# Patient Record
Sex: FEMALE | Race: WHITE | HISPANIC OR LATINO | Employment: FULL TIME | URBAN - METROPOLITAN AREA
[De-identification: names, ages, dates, MRNs, and addresses within clinical notes are randomized per-mention and may not be internally consistent; named-entity substitution may affect disease eponyms.]

---

## 2022-09-21 ENCOUNTER — OFFICE VISIT (OUTPATIENT)
Dept: OBSTETRICS AND GYNECOLOGY | Facility: CLINIC | Age: 32
End: 2022-09-21

## 2022-09-21 ENCOUNTER — PATIENT ROUNDING (BHMG ONLY) (OUTPATIENT)
Dept: OBSTETRICS AND GYNECOLOGY | Facility: CLINIC | Age: 32
End: 2022-09-21

## 2022-09-21 VITALS
WEIGHT: 118.2 LBS | SYSTOLIC BLOOD PRESSURE: 124 MMHG | DIASTOLIC BLOOD PRESSURE: 86 MMHG | BODY MASS INDEX: 17.51 KG/M2 | HEIGHT: 69 IN

## 2022-09-21 DIAGNOSIS — N64.3 GALACTORRHEA NOT ASSOCIATED WITH CHILDBIRTH: Primary | ICD-10-CM

## 2022-09-21 DIAGNOSIS — N63.11 MASS OF UPPER OUTER QUADRANT OF RIGHT BREAST: ICD-10-CM

## 2022-09-21 PROCEDURE — 99203 OFFICE O/P NEW LOW 30 MIN: CPT | Performed by: OBSTETRICS & GYNECOLOGY

## 2022-09-21 RX ORDER — MULTIPLE VITAMINS W/ MINERALS TAB 9MG-400MCG
1 TAB ORAL DAILY
COMMUNITY

## 2022-09-21 NOTE — PROGRESS NOTES
A MY-CHART MESSAGE HAS BEEN SENT TO THE PATIENT FOR PATIENT ROUNDING WITH [facility]

## 2022-09-21 NOTE — PROGRESS NOTES
"PROBLEM VISIT    Chief Complaint: mass in right breast      Stephanie Durham is a 32 y.o. patient who presents as a new pt with complaints of a right breast mass noted in 2022. Pt reports the mass is persistent but swells during her menstrual cycles. The mass is tender. She has hx of a left lactating adenoma that was biopsied:2017. Maternal GM with breast cancer at age 83. : pt stopped breast feeding 2020. Pt reporting bilateral milky discharge- spontaneous. She states she had a work up in 2018 that was negative. She reports she has not noted any discharge in the last several months. Pt is not on any contraception- she is ok with pregnancy.     Chief Complaint   Patient presents with   • Breast Mass     LUMP IN RIGHT BREAST             The following portions of the patient's history were reviewed and updated as appropriate: allergies, current medications and problem list.    Review of Systems   Constitutional: Negative for appetite change, chills, fatigue, fever and unexpected weight change.   Gastrointestinal: Negative for abdominal distention, abdominal pain, anal bleeding, blood in stool, constipation, diarrhea, nausea and vomiting.   Genitourinary: Negative for dyspareunia, dysuria, menstrual problem, pelvic pain, vaginal bleeding, vaginal discharge and vaginal pain.       /86   Ht 175.3 cm (69\")   Wt 53.6 kg (118 lb 3.2 oz)   LMP 2022 (Exact Date)   BMI 17.46 kg/m²     Physical Exam  Constitutional:       General: She is not in acute distress.     Appearance: Normal appearance. She is not ill-appearing, toxic-appearing or diaphoretic.   Chest:   Breasts:      Right: Tenderness present. No swelling, bleeding, inverted nipple, mass, nipple discharge or skin change.      Left: Nipple discharge present. No swelling, bleeding, inverted nipple, mass, skin change or tenderness.        Comments: No mass noted of right outer breast were patient's having her complaints.  Costochondritis " noted as she is very tender between her ribs in this area.  Patient's palpable concerns are equal on the right and the left side and no dominant masses noted.  With manipulation of the nipple a milky discharge was released from the left nipple from 1 duct.  Neurological:      General: No focal deficit present.      Mental Status: She is alert and oriented to person, place, and time. Mental status is at baseline.      Motor: No weakness.      Gait: Gait normal.   Psychiatric:         Mood and Affect: Mood normal.         Behavior: Behavior normal.         Thought Content: Thought content normal.         Judgment: Judgment normal.           Assessment & Plan   Diagnoses and all orders for this visit:    1. Galactorrhea not associated with childbirth (Primary)  -     Prolactin  -     TSH Rfx On Abnormal To Free T4    2. Mass of upper outer quadrant of right breast      32-year-old with complaints of right breast mass and milky discharge    No palpable masses on physical exam.  Patient does have some tenderness between her ribs.  Patient does admit to carrying her child on the side and suspect that it that is related to some of the discomfort that she is feeling.  We will check TSH and prolactin to evaluate for the milky discharge.  If the labs are normal then we will proceed with some imaging to evaluate.             No follow-ups on file.      Brenda Jacob DO    9/21/2022  10:36 EDT

## 2022-09-22 LAB
PROLACTIN SERPL-MCNC: 6.9 NG/ML (ref 4.8–23.3)
TSH SERPL DL<=0.005 MIU/L-ACNC: 1.05 UIU/ML (ref 0.27–4.2)

## 2022-09-28 DIAGNOSIS — N64.3 GALACTORRHEA NOT ASSOCIATED WITH CHILDBIRTH: Primary | ICD-10-CM

## 2022-10-20 ENCOUNTER — TELEPHONE (OUTPATIENT)
Dept: OBSTETRICS AND GYNECOLOGY | Facility: CLINIC | Age: 32
End: 2022-10-20

## 2022-10-20 ENCOUNTER — HOSPITAL ENCOUNTER (OUTPATIENT)
Dept: MAMMOGRAPHY | Facility: HOSPITAL | Age: 32
Discharge: HOME | End: 2022-10-20

## 2022-10-20 ENCOUNTER — HOSPITAL ENCOUNTER (OUTPATIENT)
Dept: ULTRASOUND IMAGING | Facility: HOSPITAL | Age: 32
Discharge: HOME | End: 2022-10-20

## 2022-10-20 DIAGNOSIS — N64.3 GALACTORRHEA NOT ASSOCIATED WITH CHILDBIRTH: ICD-10-CM

## 2022-10-20 PROCEDURE — G0279 TOMOSYNTHESIS, MAMMO: HCPCS

## 2022-10-20 PROCEDURE — 77066 DX MAMMO INCL CAD BI: CPT

## 2022-10-20 PROCEDURE — 76642 ULTRASOUND BREAST LIMITED: CPT

## 2022-11-01 DIAGNOSIS — N63.20 MASS OF MULTIPLE SITES OF LEFT BREAST: ICD-10-CM

## 2022-11-01 DIAGNOSIS — N64.3 GALACTORRHEA NOT ASSOCIATED WITH CHILDBIRTH: Primary | ICD-10-CM

## 2022-11-01 NOTE — TELEPHONE ENCOUNTER
I spoke with patient and reviewed her imagine with her. She also has galactorrhea from breasts. Will refer to breast specialist.

## 2022-11-02 ENCOUNTER — TELEPHONE (OUTPATIENT)
Dept: SURGERY | Facility: CLINIC | Age: 32
End: 2022-11-02

## 2022-11-10 PROBLEM — N64.52 NIPPLE DISCHARGE: Status: ACTIVE | Noted: 2022-11-10

## 2022-11-11 ENCOUNTER — TELEPHONE (OUTPATIENT)
Dept: SURGERY | Facility: CLINIC | Age: 32
End: 2022-11-11

## 2022-11-11 NOTE — TELEPHONE ENCOUNTER
Pt appt r/s due to family illness.    Pt r/s to 11/18/2022 at 8:40 am, arrive 8:10 am.    Spoke with pt howie holguin.    MEB

## 2022-11-17 NOTE — PROGRESS NOTES
"[facility]     Referring Provider: Brenda Jacob DO     Chief complaint: breast mass and nipple discharge     HPI: Ms. Stephanie Durham is a 31 yo woman, seen at the request of Brenda Jacob DO, for a new diagnosis of nipple discharge and right breast mass.      She denies any breast lumps, pain or skin changes.  2017 needle biopsy that showed left lactating adenoma which was not removed.      She reports that she is otherwise healthy and does not take any medications.     She has a family history of breast cancer in her Maternal grandmother aged 82.     She was by herself in clinic today.  Today presenting with concerns regarding abnormal imaging and right outer quadrant breast pain since .  The pain comes and goes and gets worse with her period. She has a hx of brady milky discharge for the past 9 years.  Very small amount, makes nipples crusty. Not expressing, not bloody, thinks from one duct    I personally reviewed her records and summarized her relevant breast history/imagin2022 Saw Dr. Jacob  \"Stephanie Durham is a 32 y.o. patient who presents as a new pt with complaints of a right breast mass noted in 2022. Pt reports the mass is persistent but swells during her menstrual cycles. The mass is tender. She has hx of a left lactating adenoma that was biopsied:2017. Maternal GM with breast cancer at age 83. : pt stopped breast feeding 2020. Pt reporting bilateral milky discharge- spontaneous. She states she had a work up in 2018 that was negative. She reports she has not noted any discharge in the last several months. Pt is not on any contraception- she is ok with pregnancy. \"  \"No mass noted of right outer breast were patient's having her complaints.  Costochondritis noted as she is very tender between her ribs in this area.  Patient's palpable concerns are equal on the right and the left side and no dominant masses noted.  With manipulation of the nipple a milky discharge " "was released from the left nipple from 1 duct\"    9/21/2022   TSH 1.050  Prolactin 6.9    10/20/2022 diagnostic bilateral mammogram and bilateral limited ultrasound at [facility]  FINDINGS:  There are scattered areas of fibroglandular density.   . In the left upper outer quadrant there is a 10.5 x 7.3 mm oval nodule  and  in the axilla high on image there is a biopsy marker clip  associated with a 11 mm nodule.  Targeted ultrasound of the left breast was performed as well as both  retroareolar regions. No retroareolar abnormalities are identified on  either side. 1 cm from the nipple on the left side, there may be a 7 mm  3 mm x 3 mm well-circumscribed nodule but this could also represent a  fat lobule. This Is uniform and nonshadowing. Another similar lesion is  noted at 1:00, 2 cm from nipple measuring 8 x 3 x 7 mm. It is also very  similar to the adjacent fat lobules.  In the axillary tail region there is a possible lesion identified  measuring 13 x 4 x 14 mm. This also is very similar to the adjacent fat  lobule  IMPRESSION:  Right breast single view mammogram and retroareolar  ultrasound was negative  Left breast single view mammogram shows 2 nodular densities. One is in  the far left upper-outer quadrant and the other in the axilla. The  axillary abnormality has a biopsy clip associated with it. No suspicious  ultrasound findings are identified in the left breast at the sites.  Possible small fibroadenomas versus fat lobules are identified 1:00 near  the nipple and in the left upper-outer quadrant.  Recommend 6 month follow-up left MLO view and repeat left breast  ultrasound  BI-RADS CATEGORY 3      Review of Systems   All other systems reviewed and are negative.      Medications:    Current Outpatient Medications:   •  multivitamin with minerals (MULTIVITAMIN ADULTS PO), Take 1 tablet by mouth Daily., Disp: , Rfl:     Allergies:  Allergies   Allergen Reactions   • Adhesive Tape Hives     bandaid " brand       Medical history:  History reviewed. No pertinent past medical history.    Surgical History:  History reviewed. No pertinent surgical history.    Family History:  Family History   Problem Relation Age of Onset   • Breast cancer Maternal Grandmother 82   • Lung cancer Maternal Grandmother        Social History:   Social History     Socioeconomic History   • Marital status:    Tobacco Use   • Smoking status: Never   • Smokeless tobacco: Never   Substance and Sexual Activity   • Alcohol use: Never   • Drug use: Never   • Sexual activity: Yes     Birth control/protection: None     Patient drink 44 ounces of caffeine a day.        GYNECOLOGIC HISTORY:   . P: 3. AB: 2.  Last menstrual period: 10/27/2022  Age at menarche: 14  Age at first childbirth: 23  Lactation/How lon months  Age at menopause: premenopausal  Total years of oral contraceptive use: 2 years  Total years of hormone replacement therapy: 0      Physical Exam  Vitals:    22 0853   BP: 108/52   Pulse: 66   SpO2: 99%     ECOG 0 - Asymptomatic  General: NAD, well appearing  Psych: a&o x 3, normal mood and affect  Eyes: EOMI, no scleral icterus  ENMT: neck supple without masses or thyromegaly, mucus membranes moist  Resp: normal effort, CTAB  CV: RRR, no murmurs, no edema   GI: soft, NT, ND  MSK: normal gait, normal ROM in bilateral shoulders  Lymph nodes:  no cervical, supraclavicular or axillary lymphadenopathy  Breast: symmetric, small  Right:  No visible abnormalities on inspection while seated, with arms raised or hands on hips. No masses, skin changes, or nipple abnormalities. No discharge   Left:  No visible abnormalities on inspection while seated, with arms raised or hands on hips. No masses, skin changes, or nipple abnormalities. No discharge      Assessment:  32 y.o. F with a new diagnosis of breast pain  2.nipple discharge  3. Abnormal imaging  4. Family history of breast cancer    Discussion:  1. We discussed types of  nipple discharge. We discussed that physiologic discharge is usually bilateral, nonspontaneous, multi-ductal, and milky, but can be yellow, green or brown. That this can sometimes be associated with elevated prolactin levels and that elevated prolactin levels can have multiple causes (pregnancy, pituitary, thyroid, medications, etc).    We discussed that pathologic nipple discharge usually is unilateral, spontaneous, persistent, comes from a single duct, is bloody, clear, or serosanguinous, or is associated with a palpable or radiological evident breast mass.   I believe this is physiologic and no MRI work-up is needed at this time.  2. I explained the concept of a BI-RADS 3 designation and the process of imaging surveillance. We discussed that a BI-RADS 3 designation describes an imaging finding that is 98-99% likely to represent a benign process. We discussed that the most common management of a BI-RADS 3 finding is initial 6 month imaging surveillance and that the entire period of imaging surveillance can often take 2 years.   3. We had a lengthy discussion about breast pain and how it can sometimes be difficult to treat. We discussed that this is often related to hormonal changes. Caffeine and nicotine can also play a role in breast pain. We also discussed the importance of wearing a good supportive bra at all times including bedtime.  We discussed additional therapies such as vitamin E supplementation and Primrose oil, and that these may or may not be useful. We discussed using OTC tylenol or ibuprofen for pain control. Finally, we discussed the use of topical NSAID creams.    4. Lifetime risk per TC model is 18.1%    Plan:  1. Take Vitamin E, 200 U, twice daily or evening primrose oil once or twice daily   3. Continue to reduce caffeine intake.   4. May use OTC tylenol or ibuprofen, or topical products, for pain control.   5. F/u in 3 months. Call sooner with any questions, concerns   6. Dx left mammo and left  limited us in 6 mons  Interventions for breast discomfort reduction were given and reviewed.  She will return to clinic in 3 months for evaluation of their effectiveness with exam only.    GISSEL Lee    I have spent 50 min in face to face time with the patient and in chart review    CC:  Brenda Jacob, DO  Provider, No Known

## 2022-11-18 ENCOUNTER — OFFICE VISIT (OUTPATIENT)
Dept: SURGERY | Facility: CLINIC | Age: 32
End: 2022-11-18

## 2022-11-18 VITALS
BODY MASS INDEX: 17.03 KG/M2 | SYSTOLIC BLOOD PRESSURE: 108 MMHG | WEIGHT: 115 LBS | HEART RATE: 66 BPM | OXYGEN SATURATION: 99 % | DIASTOLIC BLOOD PRESSURE: 52 MMHG | HEIGHT: 69 IN

## 2022-11-18 DIAGNOSIS — N64.4 BREAST PAIN: ICD-10-CM

## 2022-11-18 DIAGNOSIS — Z80.3 FAMILY HISTORY OF BREAST CANCER: ICD-10-CM

## 2022-11-18 DIAGNOSIS — R92.8 ABNORMAL FINDING ON BREAST IMAGING: Primary | ICD-10-CM

## 2022-11-18 DIAGNOSIS — N64.52 NIPPLE DISCHARGE: ICD-10-CM

## 2022-11-18 PROCEDURE — 99204 OFFICE O/P NEW MOD 45 MIN: CPT | Performed by: NURSE PRACTITIONER

## 2023-02-16 NOTE — PROGRESS NOTES
"[facility]     Referring Provider: No ref. provider found     Chief complaint: breast mass and nipple discharge     HPI: Ms. Stephanie Durham is a 31 yo woman, seen at the request of No ref. provider found, for a new diagnosis of nipple discharge and right breast mass.      She denies any breast lumps, pain or skin changes.  2017 needle biopsy that showed left lactating adenoma which was not removed.      She reports that she is otherwise healthy and does not take any medications.     She has a family history of breast cancer in her Maternal grandmother aged 82.     She was by herself in clinic today.  Today presenting with concerns regarding abnormal imaging and right outer quadrant breast pain since .  The pain comes and goes and gets worse with her period. She has a hx of brady milky discharge for the past 9 years.  Very small amount, makes nipples crusty. Not expressing, not bloody, thinks from one duct      2023 Interval History  Patient presented to the office today for routine follow-up.  She has been using vitamin E daily which has helped significantly with her breast pain.  She is still experiencing breast pain but only around her period.  Also she denies parents any more nipple discharge since before being seen the last time that she was here.  She already has imaging set up from May to follow-up on a BI-RADS 3.  She has no new breast concerns today.    I personally reviewed her records and summarized her relevant breast history/imagin2022 Saw Dr. Jacob  \"Stephanie Durham is a 32 y.o. patient who presents as a new pt with complaints of a right breast mass noted in 2022. Pt reports the mass is persistent but swells during her menstrual cycles. The mass is tender. She has hx of a left lactating adenoma that was biopsied:2017. Maternal GM with breast cancer at age 83. : pt stopped breast feeding 2020. Pt reporting bilateral milky discharge- spontaneous. She states she had " "a work up in 2018 that was negative. She reports she has not noted any discharge in the last several months. Pt is not on any contraception- she is ok with pregnancy. \"  \"No mass noted of right outer breast were patient's having her complaints.  Costochondritis noted as she is very tender between her ribs in this area.  Patient's palpable concerns are equal on the right and the left side and no dominant masses noted.  With manipulation of the nipple a milky discharge was released from the left nipple from 1 duct\"    9/21/2022   TSH 1.050  Prolactin 6.9    10/20/2022 diagnostic bilateral mammogram and bilateral limited ultrasound at [facility]  FINDINGS:  There are scattered areas of fibroglandular density.   . In the left upper outer quadrant there is a 10.5 x 7.3 mm oval nodule  and  in the axilla high on image there is a biopsy marker clip  associated with a 11 mm nodule.  Targeted ultrasound of the left breast was performed as well as both  retroareolar regions. No retroareolar abnormalities are identified on  either side. 1 cm from the nipple on the left side, there may be a 7 mm  3 mm x 3 mm well-circumscribed nodule but this could also represent a  fat lobule. This Is uniform and nonshadowing. Another similar lesion is  noted at 1:00, 2 cm from nipple measuring 8 x 3 x 7 mm. It is also very  similar to the adjacent fat lobules.  In the axillary tail region there is a possible lesion identified  measuring 13 x 4 x 14 mm. This also is very similar to the adjacent fat  lobule  IMPRESSION:  Right breast single view mammogram and retroareolar  ultrasound was negative  Left breast single view mammogram shows 2 nodular densities. One is in  the far left upper-outer quadrant and the other in the axilla. The  axillary abnormality has a biopsy clip associated with it. No suspicious  ultrasound findings are identified in the left breast at the sites.  Possible small fibroadenomas versus fat lobules are " identified 1:00 near  the nipple and in the left upper-outer quadrant.  Recommend 6 month follow-up left MLO view and repeat left breast  ultrasound  BI-RADS CATEGORY 3      Review of Systems   All other systems reviewed and are negative.      Medications:    Current Outpatient Medications:   •  multivitamin with minerals tablet tablet, Take 1 tablet by mouth Daily., Disp: , Rfl:   •  VITAMIN E PO, Take  by mouth Daily., Disp: , Rfl:     Allergies:  Allergies   Allergen Reactions   • Adhesive Tape Hives     bandaid brand       Medical history:  History reviewed. No pertinent past medical history.    Surgical History:  History reviewed. No pertinent surgical history.    Family History:  Family History   Problem Relation Age of Onset   • Breast cancer Maternal Grandmother 82   • Lung cancer Maternal Grandmother        Social History:   Social History     Socioeconomic History   • Marital status:    Tobacco Use   • Smoking status: Never   • Smokeless tobacco: Never   Substance and Sexual Activity   • Alcohol use: Never   • Drug use: Never   • Sexual activity: Yes     Birth control/protection: None     Patient drink 44 ounces of caffeine a day.        GYNECOLOGIC HISTORY:   . P: 3. AB: 2.  Last menstrual period: 10/27/2022  Age at menarche: 14  Age at first childbirth: 23  Lactation/How lon months  Age at menopause: premenopausal  Total years of oral contraceptive use: 2 years  Total years of hormone replacement therapy: 0      Physical Exam  Vitals:    23 0940   BP: 108/66   Pulse: 75   Temp: 97.2 °F (36.2 °C)   SpO2: 98%     ECOG 0 - Asymptomatic  General: NAD, well appearing  Psych: a&o x 3, normal mood and affect  Eyes: EOMI, no scleral icterus  ENMT: neck supple without masses or thyromegaly, mucus membranes moist  Resp: normal effort, CTAB  CV: RRR, no murmurs, no edema   GI: soft, NT, ND  MSK: normal gait, normal ROM in bilateral shoulders  Lymph nodes:  no cervical, supraclavicular or  axillary lymphadenopathy  Breast: symmetric, small  Right:  No visible abnormalities on inspection while seated, with arms raised or hands on hips. No masses, skin changes, or nipple abnormalities. No discharge   Left:  No visible abnormalities on inspection while seated, with arms raised or hands on hips. No masses, skin changes, or nipple abnormalities. No discharge      Assessment:  32 y.o. F with a new diagnosis of breast pain  2.nipple discharge  3. Abnormal imaging  4. Family history of breast cancer    Discussion:  1. We discussed types of nipple discharge. We discussed that physiologic discharge is usually bilateral, nonspontaneous, multi-ductal, and milky, but can be yellow, green or brown. That this can sometimes be associated with elevated prolactin levels and that elevated prolactin levels can have multiple causes (pregnancy, pituitary, thyroid, medications, etc).    We discussed that pathologic nipple discharge usually is unilateral, spontaneous, persistent, comes from a single duct, is bloody, clear, or serosanguinous, or is associated with a palpable or radiological evident breast mass.   I believe this is physiologic and no MRI work-up is needed at this time.  2. I explained the concept of a BI-RADS 3 designation and the process of imaging surveillance. We discussed that a BI-RADS 3 designation describes an imaging finding that is 98-99% likely to represent a benign process. We discussed that the most common management of a BI-RADS 3 finding is initial 6 month imaging surveillance and that the entire period of imaging surveillance can often take 2 years.   3. We had a lengthy discussion about breast pain and how it can sometimes be difficult to treat. We discussed that this is often related to hormonal changes. Caffeine and nicotine can also play a role in breast pain. We also discussed the importance of wearing a good supportive bra at all times including bedtime.  We discussed additional therapies  such as vitamin E supplementation and Primrose oil, and that these may or may not be useful. We discussed using OTC tylenol or ibuprofen for pain control. Finally, we discussed the use of topical NSAID creams.    4. Lifetime risk per TC model is 18.1%    Plan:  1. Take Vitamin E, 200 U, twice daily or evening primrose oil once or twice daily   3. Continue to reduce caffeine intake.   4. May use OTC tylenol or ibuprofen, or topical products, for pain control.   5. F/u in 3 months. Call sooner with any questions, concerns   6. Dx left mammo and left limited us in May, will call with results  Interventions for breast discomfort reduction were given and reviewed.  She will return to clinic in 3 months for evaluation of their effectiveness with exam only.    GISSEL Lee    I have spent 50 min in face to face time with the patient and in chart review    CC:  No ref. provider found  Provider, No Known

## 2023-02-17 ENCOUNTER — OFFICE VISIT (OUTPATIENT)
Dept: SURGERY | Facility: CLINIC | Age: 33
End: 2023-02-17
Payer: COMMERCIAL

## 2023-02-17 VITALS
TEMPERATURE: 97.2 F | OXYGEN SATURATION: 98 % | HEART RATE: 75 BPM | HEIGHT: 69 IN | SYSTOLIC BLOOD PRESSURE: 108 MMHG | DIASTOLIC BLOOD PRESSURE: 66 MMHG | WEIGHT: 122.4 LBS | BODY MASS INDEX: 18.13 KG/M2

## 2023-02-17 DIAGNOSIS — Z80.3 FAMILY HISTORY OF BREAST CANCER: ICD-10-CM

## 2023-02-17 DIAGNOSIS — N64.52 NIPPLE DISCHARGE: ICD-10-CM

## 2023-02-17 DIAGNOSIS — R92.8 ABNORMAL FINDING ON BREAST IMAGING: ICD-10-CM

## 2023-02-17 DIAGNOSIS — N64.4 BREAST PAIN: Primary | ICD-10-CM

## 2023-02-17 PROCEDURE — 99213 OFFICE O/P EST LOW 20 MIN: CPT | Performed by: NURSE PRACTITIONER

## 2023-05-12 ENCOUNTER — HOSPITAL ENCOUNTER (OUTPATIENT)
Dept: MAMMOGRAPHY | Facility: HOSPITAL | Age: 33
Discharge: HOME | End: 2023-05-12
Payer: COMMERCIAL

## 2023-05-12 ENCOUNTER — HOSPITAL ENCOUNTER (OUTPATIENT)
Dept: ULTRASOUND IMAGING | Facility: HOSPITAL | Age: 33
Discharge: HOME | End: 2023-05-12
Payer: COMMERCIAL

## 2023-05-12 DIAGNOSIS — R92.8 ABNORMAL FINDING ON BREAST IMAGING: ICD-10-CM

## 2023-05-12 PROCEDURE — 77065 DX MAMMO INCL CAD UNI: CPT

## 2023-05-12 PROCEDURE — G0279 TOMOSYNTHESIS, MAMMO: HCPCS

## 2023-05-12 PROCEDURE — 76642 ULTRASOUND BREAST LIMITED: CPT

## 2023-05-15 ENCOUNTER — TELEPHONE (OUTPATIENT)
Dept: SURGERY | Facility: CLINIC | Age: 33
End: 2023-05-15
Payer: COMMERCIAL

## 2023-05-17 ENCOUNTER — TELEPHONE (OUTPATIENT)
Dept: SURGERY | Facility: CLINIC | Age: 33
End: 2023-05-17
Payer: COMMERCIAL

## 2023-05-17 DIAGNOSIS — R92.8 ABNORMAL FINDING ON BREAST IMAGING: Primary | ICD-10-CM

## 2023-05-17 NOTE — TELEPHONE ENCOUNTER
Called and spoke to pt and let her know her ultrasound is on 11/10/2023 @11am here at the [facility] on [facility]. Her appointment with Siva will be a week after that on 11/17 @120pm       Pt stated understanding

## 2023-05-17 NOTE — TELEPHONE ENCOUNTER
Called and gave her her left diagnostic and ultrasound results of BI-RADS 3.  Please set her up for a ultrasound in 6 months followed by an office visit with me.  The order for the ultrasound has been placed.

## 2023-05-17 NOTE — TELEPHONE ENCOUNTER
Siva Dudley, GISSEL 11 minutes ago (8:32 AM)     KE  Called and gave her her left diagnostic and ultrasound results of BI-RADS 3.  Please set her up for a ultrasound in 6 months followed by an office visit with me.  The order for the ultrasound has been placed.              Called and spoke to pt and let her know her ultrasound is on 11/10/2023 @11am here at the [facility]. Her appointment with Siva will be a week after that on 11/17 @120pm       Pt stated understanding

## 2023-09-01 ENCOUNTER — OFFICE VISIT (OUTPATIENT)
Dept: INTERNAL MEDICINE | Facility: CLINIC | Age: 33
End: 2023-09-01
Payer: COMMERCIAL

## 2023-09-01 VITALS
SYSTOLIC BLOOD PRESSURE: 112 MMHG | TEMPERATURE: 97.7 F | DIASTOLIC BLOOD PRESSURE: 68 MMHG | RESPIRATION RATE: 16 BRPM | BODY MASS INDEX: 17.51 KG/M2 | HEART RATE: 74 BPM | OXYGEN SATURATION: 100 % | HEIGHT: 69 IN | WEIGHT: 118.2 LBS

## 2023-09-01 DIAGNOSIS — L71.9 ROSACEA: ICD-10-CM

## 2023-09-01 DIAGNOSIS — E04.1 THYROID NODULE: ICD-10-CM

## 2023-09-01 DIAGNOSIS — R55 VASOVAGAL SYNCOPE: Primary | ICD-10-CM

## 2023-09-01 DIAGNOSIS — N92.6 IRREGULAR PERIODS: ICD-10-CM

## 2023-09-01 PROBLEM — Z34.90 TERM PREGNANCY: Status: ACTIVE | Noted: 2019-07-10

## 2023-09-01 PROCEDURE — 99204 OFFICE O/P NEW MOD 45 MIN: CPT | Performed by: INTERNAL MEDICINE

## 2023-09-01 RX ORDER — ONDANSETRON 8 MG/1
TABLET, ORALLY DISINTEGRATING ORAL
COMMUNITY
Start: 2023-06-17 | End: 2023-10-09

## 2023-09-01 RX ORDER — METRONIDAZOLE 10 MG/G
GEL TOPICAL DAILY
Qty: 60 G | Refills: 5 | Status: SHIPPED | OUTPATIENT
Start: 2023-09-01

## 2023-09-01 NOTE — PROGRESS NOTES
"Chief Complaint  Establish Care (Passing out and fainting/Breast issues/Thyroid nodule)    Subjective        Stephanie Durham presents to [facility]  History of Present Illness  Here to establish care    Syncopal episodes - prodrome prior and feels palpitations, sweating, etc. No chest pain or shortness of breath, sporadic, no clear culprit  Thyroid nodule reported, imaging benign per her report  Seeing breast surgery chronic breast issues    Current Outpatient Medications:     multivitamin with minerals tablet tablet, Take 1 tablet by mouth Daily., Disp: , Rfl:     ondansetron ODT (ZOFRAN-ODT) 8 MG disintegrating tablet, , Disp: , Rfl:     VITAMIN E PO, Take  by mouth Daily., Disp: , Rfl:     metroNIDAZOLE (Metrogel) 1 % gel, Apply  topically to the appropriate area as directed Daily., Disp: 60 g, Rfl: 5  Past Medical History:   Diagnosis Date    Anemia     Anxiety     Headache     History of medical problems     Hypertension     Urinary tract infection     Visual impairment      Past Surgical History:   Procedure Laterality Date    BREAST SURGERY Left 2017    Left Breast Biopsy     Family History   Problem Relation Age of Onset    Mental illness Mother     Arthritis Mother     Anxiety disorder Mother     Cancer Father     Learning disabilities Brother     Thyroid disease Maternal Grandmother     Breast cancer Maternal Grandmother 82    Lung cancer Maternal Grandmother      Social History     Socioeconomic History    Marital status:    Tobacco Use    Smoking status: Never    Smokeless tobacco: Never   Vaping Use    Vaping Use: Never used   Substance and Sexual Activity    Alcohol use: Never    Drug use: Never    Sexual activity: Yes     Partners: Male     Birth control/protection: None        reviewed and updated    Objective   Vital Signs:  /68   Pulse 74   Temp 97.7 °F (36.5 °C)   Resp 16   Ht 175.3 cm (69\")   Wt 53.6 kg (118 lb 3.2 oz)   SpO2 " "100%   BMI 17.46 kg/m²   Estimated body mass index is 17.46 kg/m² as calculated from the following:    Height as of this encounter: 175.3 cm (69\").    Weight as of this encounter: 53.6 kg (118 lb 3.2 oz).    Physical Exam  Vitals and nursing note reviewed.   Constitutional:       General: She is not in acute distress.     Appearance: Normal appearance.   HENT:      Head: Normocephalic and atraumatic.      Right Ear: External ear normal.      Left Ear: External ear normal.      Nose: Nose normal. No congestion.      Mouth/Throat:      Mouth: Mucous membranes are moist.      Pharynx: No oropharyngeal exudate or posterior oropharyngeal erythema.   Eyes:      Extraocular Movements: Extraocular movements intact.      Conjunctiva/sclera: Conjunctivae normal.      Pupils: Pupils are equal, round, and reactive to light.   Cardiovascular:      Rate and Rhythm: Normal rate and regular rhythm.      Pulses: Normal pulses.      Heart sounds: Normal heart sounds. No murmur heard.  Pulmonary:      Effort: Pulmonary effort is normal. No respiratory distress.      Breath sounds: Normal breath sounds. No wheezing or rales.   Musculoskeletal:      Cervical back: Normal range of motion.   Skin:     General: Skin is warm.      Capillary Refill: Capillary refill takes less than 2 seconds.   Neurological:      General: No focal deficit present.      Mental Status: She is alert and oriented to person, place, and time. Mental status is at baseline.      Cranial Nerves: No cranial nerve deficit.   Psychiatric:         Mood and Affect: Mood normal.         Behavior: Behavior normal.         Thought Content: Thought content normal.        Result Review :  The following data was reviewed by: Pedro Lema MD on 09/01/2023:  Common labs          9/1/2023    10:52   Common Labs   Glucose 81    BUN 15    Creatinine 0.83    Sodium 140    Potassium 4.4    Chloride 104    Calcium 9.7    Total Protein 6.9    Albumin 4.5    Total Bilirubin 0.5  "   Alkaline Phosphatase 37    AST (SGOT) 12    ALT (SGPT) 7    WBC 5.83    Hemoglobin 13.8    Hematocrit 41.2    Platelets 291    Total Cholesterol 172    Triglycerides 41    HDL Cholesterol 68    LDL Cholesterol  95    Hemoglobin A1C 4.80      Data reviewed : prior office notes reviewed      ECG 12 Lead    Date/Time: 10/8/2023 10:56 AM  Performed by: Pedro Lema MD    Authorized by: Pedro Lema MD  Comparison: not compared with previous ECG   Rhythm: sinus rhythm  Rate: normal  Conduction: conduction normal  ST Segments: ST segments normal  T Waves: T waves normal  QRS axis: normal  Other: no other findings    Clinical impression: normal ECG            Assessment and Plan   Diagnoses and all orders for this visit:    1. Vasovagal syncope (Primary)  -     CBC w AUTO Differential  -     Comprehensive metabolic panel  -     TSH  -     T4, free  -     Thyroid Antibodies  -     Lipid panel  -     Hemoglobin A1c  -     ECG 12 Lead  -     Iron and TIBC  -     Ferritin    2. Thyroid nodule  -     TSH  -     T4, free  -     Thyroid Antibodies    3. Irregular periods  -     Ambulatory Referral to Obstetrics / Gynecology    4. Rosacea  -     metroNIDAZOLE (Metrogel) 1 % gel; Apply  topically to the appropriate area as directed Daily.  Dispense: 60 g; Refill: 5  -     Ambulatory Referral to Dermatology    Vasovagal syncope - check labs and EKG unrevealing   Thyroid nodule - will check thyroid studies and determine next steps pending results       I spent 20 minutes caring for Stephanie on this date of service. This time includes time spent by me in the following activities:preparing for the visit, reviewing tests, obtaining and/or reviewing a separately obtained history, performing a medically appropriate examination and/or evaluation , counseling and educating the patient/family/caregiver, ordering medications, tests, or procedures, and documenting information in the medical record  Follow Up   Return for Next scheduled follow  up.  Patient was given instructions and counseling regarding her condition or for health maintenance advice. Please see specific information pulled into the AVS if appropriate.     Pedro Lema MD  [facility]

## 2023-09-05 LAB
ALBUMIN SERPL-MCNC: 4.5 G/DL (ref 3.5–5.2)
ALBUMIN/GLOB SERPL: 1.9 G/DL
ALP SERPL-CCNC: 37 U/L (ref 39–117)
ALT SERPL-CCNC: 7 U/L (ref 1–33)
AST SERPL-CCNC: 12 U/L (ref 1–32)
BASOPHILS # BLD AUTO: 0.07 10*3/MM3 (ref 0–0.2)
BASOPHILS NFR BLD AUTO: 1.2 % (ref 0–1.5)
BILIRUB SERPL-MCNC: 0.5 MG/DL (ref 0–1.2)
BUN SERPL-MCNC: 15 MG/DL (ref 6–20)
BUN/CREAT SERPL: 18.1 (ref 7–25)
CALCIUM SERPL-MCNC: 9.7 MG/DL (ref 8.6–10.5)
CHLORIDE SERPL-SCNC: 104 MMOL/L (ref 98–107)
CHOLEST SERPL-MCNC: 172 MG/DL (ref 0–200)
CO2 SERPL-SCNC: 24.9 MMOL/L (ref 22–29)
CREAT SERPL-MCNC: 0.83 MG/DL (ref 0.57–1)
EGFRCR SERPLBLD CKD-EPI 2021: 96.2 ML/MIN/1.73
EOSINOPHIL # BLD AUTO: 0.34 10*3/MM3 (ref 0–0.4)
EOSINOPHIL NFR BLD AUTO: 5.8 % (ref 0.3–6.2)
ERYTHROCYTE [DISTWIDTH] IN BLOOD BY AUTOMATED COUNT: 11.5 % (ref 12.3–15.4)
FERRITIN SERPL-MCNC: 50 NG/ML (ref 13–150)
GLOBULIN SER CALC-MCNC: 2.4 GM/DL
GLUCOSE SERPL-MCNC: 81 MG/DL (ref 65–99)
HBA1C MFR BLD: 4.8 % (ref 4.8–5.6)
HCT VFR BLD AUTO: 41.2 % (ref 34–46.6)
HDLC SERPL-MCNC: 68 MG/DL (ref 40–60)
HGB BLD-MCNC: 13.8 G/DL (ref 12–15.9)
IMM GRANULOCYTES # BLD AUTO: 0.01 10*3/MM3 (ref 0–0.05)
IMM GRANULOCYTES NFR BLD AUTO: 0.2 % (ref 0–0.5)
IRON SATN MFR SERPL: 27 % (ref 20–50)
IRON SERPL-MCNC: 96 MCG/DL (ref 37–145)
LDLC SERPL CALC-MCNC: 95 MG/DL (ref 0–100)
LYMPHOCYTES # BLD AUTO: 2.48 10*3/MM3 (ref 0.7–3.1)
LYMPHOCYTES NFR BLD AUTO: 42.5 % (ref 19.6–45.3)
MCH RBC QN AUTO: 31.9 PG (ref 26.6–33)
MCHC RBC AUTO-ENTMCNC: 33.5 G/DL (ref 31.5–35.7)
MCV RBC AUTO: 95.2 FL (ref 79–97)
MONOCYTES # BLD AUTO: 0.43 10*3/MM3 (ref 0.1–0.9)
MONOCYTES NFR BLD AUTO: 7.4 % (ref 5–12)
NEUTROPHILS # BLD AUTO: 2.5 10*3/MM3 (ref 1.7–7)
NEUTROPHILS NFR BLD AUTO: 42.9 % (ref 42.7–76)
NRBC BLD AUTO-RTO: 0.2 /100 WBC (ref 0–0.2)
PLATELET # BLD AUTO: 291 10*3/MM3 (ref 140–450)
POTASSIUM SERPL-SCNC: 4.4 MMOL/L (ref 3.5–5.2)
PROT SERPL-MCNC: 6.9 G/DL (ref 6–8.5)
RBC # BLD AUTO: 4.33 10*6/MM3 (ref 3.77–5.28)
SODIUM SERPL-SCNC: 140 MMOL/L (ref 136–145)
T4 FREE SERPL-MCNC: 1.38 NG/DL (ref 0.93–1.7)
THYROGLOB AB SERPL-ACNC: <1 IU/ML (ref 0–0.9)
THYROPEROXIDASE AB SERPL-ACNC: <9 IU/ML (ref 0–34)
TIBC SERPL-MCNC: 350 MCG/DL
TRIGL SERPL-MCNC: 41 MG/DL (ref 0–150)
TSH SERPL DL<=0.005 MIU/L-ACNC: 0.75 UIU/ML (ref 0.27–4.2)
UIBC SERPL-MCNC: 254 MCG/DL (ref 112–346)
VLDLC SERPL CALC-MCNC: 9 MG/DL (ref 5–40)
WBC # BLD AUTO: 5.83 10*3/MM3 (ref 3.4–10.8)

## 2023-10-08 PROCEDURE — 93000 ELECTROCARDIOGRAM COMPLETE: CPT | Performed by: INTERNAL MEDICINE

## 2023-10-09 ENCOUNTER — OFFICE VISIT (OUTPATIENT)
Dept: OBSTETRICS AND GYNECOLOGY | Facility: CLINIC | Age: 33
End: 2023-10-09
Payer: COMMERCIAL

## 2023-10-09 VITALS
HEIGHT: 69 IN | WEIGHT: 117 LBS | SYSTOLIC BLOOD PRESSURE: 102 MMHG | DIASTOLIC BLOOD PRESSURE: 62 MMHG | BODY MASS INDEX: 17.33 KG/M2

## 2023-10-09 DIAGNOSIS — Z13.89 SCREENING FOR GENITOURINARY CONDITION: ICD-10-CM

## 2023-10-09 DIAGNOSIS — Z01.419 ROUTINE GYNECOLOGICAL EXAMINATION: ICD-10-CM

## 2023-10-09 DIAGNOSIS — Z11.51 SPECIAL SCREENING EXAMINATION FOR HUMAN PAPILLOMAVIRUS (HPV): ICD-10-CM

## 2023-10-09 DIAGNOSIS — Z01.419 CERVICAL SMEAR, AS PART OF ROUTINE GYNECOLOGICAL EXAMINATION: Primary | ICD-10-CM

## 2023-10-09 DIAGNOSIS — Z31.69 INFERTILITY COUNSELING: ICD-10-CM

## 2023-10-09 LAB
B-HCG UR QL: NEGATIVE
BILIRUB BLD-MCNC: NEGATIVE MG/DL
CLARITY, POC: CLEAR
COLOR UR: YELLOW
EXPIRATION DATE: NORMAL
GLUCOSE UR STRIP-MCNC: NEGATIVE MG/DL
INTERNAL NEGATIVE CONTROL: NORMAL
INTERNAL POSITIVE CONTROL: NORMAL
KETONES UR QL: NEGATIVE
LEUKOCYTE EST, POC: NEGATIVE
Lab: NORMAL
NITRITE UR-MCNC: NEGATIVE MG/ML
PH UR: 5 [PH] (ref 5–8)
PROT UR STRIP-MCNC: NEGATIVE MG/DL
RBC # UR STRIP: NEGATIVE /UL
SP GR UR: 1 (ref 1–1.03)
UROBILINOGEN UR QL: NORMAL

## 2023-10-09 NOTE — PROGRESS NOTES
GYN Annual Exam     CC- Here for annual exam.     Stephanie Durham is a 33 y.o. female previous patient not seen in the last three years who presents for annual well woman exam. Periods are regular every 20-40 days, lasting  3-7 days   days.     Pt is a ; unable to have + Hcg in the last couple of years. Uses NFP and withdrawal method. Spouse is in 2nd year of law school.     OB History          5    Para   3    Term   3            AB   2    Living   3         SAB   2    IAB        Ectopic        Molar        Multiple        Live Births   3                Menarche: 13yo   Current contraception: coitus interruptus and rhythm method  History of abnormal Pap smear: no  History of abnormal mammogram:  Is followed by a breast surgeon for a lump. Has imaging q 6 months for 2 years   Family history of uterine, colon or ovarian cancer: no  Family history of breast cancer: no  H/o STDs: Denies   Last pap:??  Gardasil:refuses  KARUNA: none    Health Maintenance   Topic Date Due    Annual Gynecologic Pelvic and Breast Exam  Never done    HEPATITIS C SCREENING  Never done    ANNUAL PHYSICAL  Never done    PAP SMEAR  Never done    INFLUENZA VACCINE  2023    COVID-19 Vaccine (4 - -24 season) 2023    BMI FOLLOWUP  2023    TDAP/TD VACCINES (4 - Td or Tdap) 2025    Pneumococcal Vaccine 0-64  Aged Out       Past Medical History:   Diagnosis Date    Anemia     Anxiety     Headache     History of medical problems     Hypertension     Urinary tract infection     Visual impairment        Past Surgical History:   Procedure Laterality Date    BREAST SURGERY Left 2017    Left Breast Biopsy         Current Outpatient Medications:     metroNIDAZOLE (Metrogel) 1 % gel, Apply  topically to the appropriate area as directed Daily., Disp: 60 g, Rfl: 5    multivitamin with minerals tablet tablet, Take 1 tablet by mouth Daily., Disp: , Rfl:     Allergies   Allergen Reactions    Adhesive Tape Hives      "bandaid brand       Social History     Tobacco Use    Smoking status: Never    Smokeless tobacco: Never   Vaping Use    Vaping Use: Never used   Substance Use Topics    Alcohol use: Never    Drug use: Never       Family History   Problem Relation Age of Onset    Mental illness Mother     Arthritis Mother     Anxiety disorder Mother     Cancer Father     Learning disabilities Brother     Thyroid disease Maternal Grandmother     Breast cancer Maternal Grandmother 82    Lung cancer Maternal Grandmother        Review of Systems  Infertility     /62   Ht 175.3 cm (69\")   Wt 53.1 kg (117 lb)   LMP 10/02/2023   Breastfeeding No   BMI 17.28 kg/mý     Physical Exam  Exam conducted with a chaperone present.   Constitutional:       Appearance: Normal appearance.   Cardiovascular:      Rate and Rhythm: Normal rate and regular rhythm.   Pulmonary:      Effort: Pulmonary effort is normal.      Breath sounds: Normal breath sounds.   Abdominal:      General: Abdomen is flat.      Palpations: Abdomen is soft.   Genitourinary:     General: Normal vulva.      Exam position: Lithotomy position.      Vagina: Normal.      Cervix: Normal.      Uterus: Normal.       Adnexa: Right adnexa normal and left adnexa normal.   Neurological:      General: No focal deficit present.      Mental Status: She is alert and oriented to person, place, and time.   Psychiatric:         Mood and Affect: Mood normal.         Behavior: Behavior normal.            Assessment/Plan    1) GYN HM: pap/HPV  SBE demonstrated and encouraged.  2) STD screening: declines Condoms encouraged.  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   8) MMG- plan age 41  9)Follow up prn or 1 year       Diagnoses and all orders for this visit:    1. " Cervical smear, as part of routine gynecological examination (Primary)  -     IGP, Apt HPV,rfx 16 / 18,45    2. Screening for genitourinary condition  -     POC Urinalysis Dipstick  -     POC Pregnancy, Urine  -     IGP, Apt HPV,rfx 16 / 18,45    3. Routine gynecological examination  -     IGP, Apt HPV,rfx 16 / 18,45    4. Special screening examination for human papillomavirus (HPV)  -     IGP, Apt HPV,rfx 16 / 18,45    5. Infertility counseling      I spent 10 minutes on the separately reported service of Infertility; Offered labs, imaging; declines . This time is not included in the time used to support the E/M service also reported today.    Pedro Dudley DO  10/09/2023    10:31 EDT

## 2023-10-13 LAB
CYTOLOGIST CVX/VAG CYTO: NORMAL
CYTOLOGY CVX/VAG DOC CYTO: NORMAL
CYTOLOGY CVX/VAG DOC THIN PREP: NORMAL
DX ICD CODE: NORMAL
HIV 1 & 2 AB SER-IMP: NORMAL
HPV I/H RISK 4 DNA CVX QL PROBE+SIG AMP: NEGATIVE
Lab: NORMAL
OTHER STN SPEC: NORMAL
STAT OF ADQ CVX/VAG CYTO-IMP: NORMAL

## 2023-11-10 ENCOUNTER — HOSPITAL ENCOUNTER (OUTPATIENT)
Dept: ULTRASOUND IMAGING | Facility: HOSPITAL | Age: 33
Discharge: HOME | End: 2023-11-10
Admitting: NURSE PRACTITIONER
Payer: COMMERCIAL

## 2023-11-10 PROCEDURE — 76642 ULTRASOUND BREAST LIMITED: CPT

## 2023-11-16 NOTE — PROGRESS NOTES
"[facility]     Referring Provider: Brenda Jacob DO     Chief complaint: breast mass and nipple discharge     HPI: Ms. Stephanie Durham is a 33 yo woman, seen at the request of Brenda Jacob DO, for a new diagnosis of nipple discharge and right breast mass.      She denies any breast lumps, pain or skin changes.  2017 needle biopsy that showed left lactating adenoma which was not removed.      She reports that she is otherwise healthy and does not take any medications.     She has a family history of breast cancer in her Maternal grandmother aged 82.     She was by herself in clinic today.  Today presenting with concerns regarding abnormal imaging and right outer quadrant breast pain since .  The pain comes and goes and gets worse with her period. She has a hx of brady milky discharge for the past 9 years.  Very small amount, makes nipples crusty. Not expressing, not bloody, thinks from one duct        I personally reviewed her records and summarized her relevant breast history/imagin2022 Saw Dr. Jacob  \"Stephanie Durham is a 32 y.o. patient who presents as a new pt with complaints of a right breast mass noted in 2022. Pt reports the mass is persistent but swells during her menstrual cycles. The mass is tender. She has hx of a left lactating adenoma that was biopsied:2017. Maternal GM with breast cancer at age 82. : pt stopped breast feeding 2020. Pt reporting bilateral milky discharge- spontaneous. She states she had a work up in 2018 that was negative. She reports she has not noted any discharge in the last several months. Pt is not on any contraception- she is ok with pregnancy. \"  \"No mass noted of right outer breast were patient's having her complaints.  Costochondritis noted as she is very tender between her ribs in this area.  Patient's palpable concerns are equal on the right and the left side and no dominant masses noted.  With manipulation of the nipple a milky " "discharge was released from the left nipple from 1 duct\"    9/21/2022   TSH 1.050  Prolactin 6.9    10/20/2022 diagnostic bilateral mammogram and bilateral limited ultrasound at [facility]  FINDINGS:  There are scattered areas of fibroglandular density.   . In the left upper outer quadrant there is a 10.5 x 7.3 mm oval nodule  and  in the axilla high on image there is a biopsy marker clip  associated with a 11 mm nodule.  Targeted ultrasound of the left breast was performed as well as both  retroareolar regions. No retroareolar abnormalities are identified on  either side. 1 cm from the nipple on the left side, there may be a 7 mm  3 mm x 3 mm well-circumscribed nodule but this could also represent a  fat lobule. This Is uniform and nonshadowing. Another similar lesion is  noted at 1:00, 2 cm from nipple measuring 8 x 3 x 7 mm. It is also very  similar to the adjacent fat lobules.  In the axillary tail region there is a possible lesion identified  measuring 13 x 4 x 14 mm. This also is very similar to the adjacent fat  lobule  IMPRESSION:  Right breast single view mammogram and retroareolar  ultrasound was negative  Left breast single view mammogram shows 2 nodular densities. One is in  the far left upper-outer quadrant and the other in the axilla. The  axillary abnormality has a biopsy clip associated with it. No suspicious  ultrasound findings are identified in the left breast at the sites.  Possible small fibroadenomas versus fat lobules are identified 1:00 near  the nipple and in the left upper-outer quadrant.  Recommend 6 month follow-up left MLO view and repeat left breast  ultrasound  BI-RADS CATEGORY 3    2/17/2023   Patient presented to the office today for routine follow-up.  She has been using vitamin E daily which has helped significantly with her breast pain.  She is still experiencing breast pain but only around her period.  Also she denies any more nipple discharge since before being seen " the last time that she was here.  She already has imaging set up from May to follow-up on a BI-RADS 3.  She has no new breast concerns today.    11/16/2023 Interval History  Patient presenting to the office today for routine follow-up.  She has no issues with breast pain anymore her nipple discharge has completely stopped unless she manipulates the left nipple and it is a white discharge.  She does have concerns regarding some swollen tender lymph nodes under her right arm last month they seem to be under her left arm they swell up or tender and then they go away within a week.    Review of Systems   All other systems reviewed and are negative.      Medications:    Current Outpatient Medications:     metroNIDAZOLE (Metrogel) 1 % gel, Apply  topically to the appropriate area as directed Daily., Disp: 60 g, Rfl: 5    multivitamin with minerals tablet tablet, Take 1 tablet by mouth Daily., Disp: , Rfl:     Allergies:  Allergies   Allergen Reactions    Adhesive Tape Hives     bandaid brand       Medical history:  Past Medical History:   Diagnosis Date    Anemia     Anxiety     Headache     History of medical problems     Hypertension     Urinary tract infection     Visual impairment        Surgical History:  Past Surgical History:   Procedure Laterality Date    BREAST SURGERY Left 2017    Left Breast Biopsy       Family History:  Family History   Problem Relation Age of Onset    Mental illness Mother     Arthritis Mother     Anxiety disorder Mother     Cancer Father     Learning disabilities Brother     Thyroid disease Maternal Grandmother     Breast cancer Maternal Grandmother 82    Lung cancer Maternal Grandmother        Social History:   Social History     Socioeconomic History    Marital status:    Tobacco Use    Smoking status: Never     Passive exposure: Never    Smokeless tobacco: Never   Vaping Use    Vaping Use: Never used   Substance and Sexual Activity    Alcohol use: Never    Drug use: Never    Sexual  activity: Yes     Partners: Male     Birth control/protection: None     Patient drink 44 ounces of caffeine a day.        GYNECOLOGIC HISTORY:   . P: 3. AB: 2.  Last menstrual period: 10/27/2022  Age at menarche: 14  Age at first childbirth: 23  Lactation/How lon months  Age at menopause: premenopausal  Total years of oral contraceptive use: 2 years  Total years of hormone replacement therapy: 0      Physical Exam  Vitals:    23 1323   BP: 118/74   Pulse: 76   SpO2: 98%       ECOG 0 - Asymptomatic  General: NAD, well appearing  Psych: a&o x 3, normal mood and affect  Eyes: EOMI, no scleral icterus  ENMT: neck supple without masses or thyromegaly, mucus membranes moist  Resp: normal effort, CTAB  CV: RRR, no murmurs, no edema   GI: soft, NT, ND  MSK: normal gait, normal ROM in bilateral shoulders  Lymph nodes:  no cervical, supraclavicular or axillary lymphadenopathy  Breast: symmetric, small  Right:  No visible abnormalities on inspection while seated, with arms raised or hands on hips. No masses, skin changes, or nipple abnormalities. No discharge unable to palpate any lymph nodes in axilla  Left:  No visible abnormalities on inspection while seated, with arms raised or hands on hips. No masses, skin changes, or nipple abnormalities. No discharge unable to palpate any lymph nodes in axilla    Imagin2023 left diagnostic mammogram and left limited breast ultrasound at [facility]  FINDINGS: Unilateral left digital CC mammographic and MLO mammographic  and Tomosynthesis images were obtained. Comparison is made to prior  mammography dated 10/20/2022. The parenchyma of the left breast  demonstrates a heterogeneously dense pattern which lessens the  sensitivity. There is a stable area of focal asymmetry that measures on  the order of 1.3 cm in greatest dimension that is seen in the axillary  tail region of the left breast. No architectural distortion or  suspicious calcifications are visualized. There is  no evidence for skin  thickening, nipple retraction or axillary adenopathy.  ULTRASOUND: Targeted sonographic evaluation of the left breast was  performed through the 1-o'clock position in a retroareolar location, the  1-o'clock position on the order of 2 cm from the nipple and the  1-o'clock position on the order of 8 cm from the nipple in the axillary  tail region. Comparison is made to prior breast sonography dated  10/20/2022.  In the retroareolar region at the 1-o'clock position there is a 0.6 x  0.7 x 0.3 cm oval circumscribed hypoechoic mass that previously measured  0.7 x 0.8 x 0.3 cm. No detectable internal vascularity is appreciated.  The imaging features suggest a benign fibroadenoma.  At the 1-o'clock position on the order of 2 cm from the nipple there is  a 0.9 x 0.8 x 0.3 cm oval circumscribed hypoechoic mass that previously  measured 0.8 x 0.7 x 0.3 cm. The imaging features suggest a benign  fibroadenoma.  In the axillary tail region at the 1-o'clock position on the order of 8  cm from the nipple there is a 1.5 x 1.5 x 0.4 cm oval circumscribed  hypoechoic masslike region that previously measured 1.4 x 1.4 x 0.4 cm.  The imaging features are most consistent with a benign fibroadenoma  versus an area of prominent fibrous tissue.  IMPRESSION:  There are 3 stable probable benign lesions in the left breast at the  1-o'clock axis as described. Continue sonographic follow-up of left  breast in 6-9 month intervals is recommended through October 2024 to  demonstrate two-year stability.  BI-RADS Category 3: Probably benign finding. Short-term sonographic  follow-up is recommended.    11/10/2023 left Limited breast ultrasound at [facility]  FINDINGS: Ultrasound evaluation again demonstrates a small ovoid solid  nodule in retroareolar region at 1 o'clock measuring 7 x 3 x 6 mm and  showing no change from the previous exams. There is a similar solid  appearing nodule located at 1 o'clock, 2 cm from the nipple  that  measures 6 x 5 x 3 mm compared to a size of 9 x 8 x 3 mm on the previous  exam. There is a somewhat elongated ovoid solid nodule in the left  axillary tail located at 1 o'clock, 8 cm from the nipple that measures  1.5 x 1.5 x 0.4 cm and is unchanged.  CONCLUSION: Benign directed left breast ultrasound showing 3  benign-appearing solid nodules as described above. 1 of these is smaller  since 05/12/2023. The others are unchanged. A followup directed left  breast ultrasound in 1 year might be considered to ensure 2-year  stability. BI-RADS 2. Benign.      Assessment:  33 y.o. F with a new diagnosis of breast pain  2.nipple discharge  3. Abnormal imaging  4. Family history of breast cancer    Discussion:  1. We discussed types of nipple discharge. We discussed that physiologic discharge is usually bilateral, nonspontaneous, multi-ductal, and milky, but can be yellow, green or brown. That this can sometimes be associated with elevated prolactin levels and that elevated prolactin levels can have multiple causes (pregnancy, pituitary, thyroid, medications, etc).    We discussed that pathologic nipple discharge usually is unilateral, spontaneous, persistent, comes from a single duct, is bloody, clear, or serosanguinous, or is associated with a palpable or radiological evident breast mass.   I believe this is physiologic and no MRI work-up is needed at this time.  2. I explained the concept of a BI-RADS 3 designation and the process of imaging surveillance. We discussed that a BI-RADS 3 designation describes an imaging finding that is 98-99% likely to represent a benign process. We discussed that the most common management of a BI-RADS 3 finding is initial 6 month imaging surveillance and that the entire period of imaging surveillance can often take 2 years.   3. We had a lengthy discussion about breast pain and how it can sometimes be difficult to treat. We discussed that this is often related to hormonal changes.  Caffeine and nicotine can also play a role in breast pain. We also discussed the importance of wearing a good supportive bra at all times including bedtime.  We discussed additional therapies such as vitamin E supplementation and Primrose oil, and that these may or may not be useful. We discussed using OTC tylenol or ibuprofen for pain control. Finally, we discussed the use of topical NSAID creams.    4. Lifetime risk per TC model is 18.1%    Plan:  1. Take Vitamin E, 200 U, twice daily or evening primrose oil once or twice daily   3. Continue to reduce caffeine intake.   4. May use OTC tylenol or ibuprofen, or topical products, for pain control.   5.  Exam in 6 months  6.  Left limited ultrasound in 1 year to show stability for 2 years    GISSEL Lee    I have spent 20 min in face to face time with the patient and in chart review    CC:  DO Pita Sandy Adam, MD

## 2023-11-17 ENCOUNTER — OFFICE VISIT (OUTPATIENT)
Dept: SURGERY | Facility: CLINIC | Age: 33
End: 2023-11-17
Payer: COMMERCIAL

## 2023-11-17 VITALS
SYSTOLIC BLOOD PRESSURE: 118 MMHG | BODY MASS INDEX: 17.33 KG/M2 | OXYGEN SATURATION: 98 % | HEART RATE: 76 BPM | WEIGHT: 117 LBS | HEIGHT: 69 IN | DIASTOLIC BLOOD PRESSURE: 74 MMHG

## 2023-11-17 DIAGNOSIS — Z80.3 FAMILY HISTORY OF BREAST CANCER: ICD-10-CM

## 2023-11-17 DIAGNOSIS — R92.8 ABNORMAL FINDING ON BREAST IMAGING: Primary | ICD-10-CM

## 2023-11-17 PROCEDURE — 99213 OFFICE O/P EST LOW 20 MIN: CPT | Performed by: NURSE PRACTITIONER

## 2024-03-08 ENCOUNTER — TELEPHONE (OUTPATIENT)
Dept: SURGERY | Facility: CLINIC | Age: 34
End: 2024-03-08
Payer: COMMERCIAL

## 2024-03-08 NOTE — TELEPHONE ENCOUNTER
Lvm for pt to call back she has an appt with lenard kitchen on 05/17 and provider will not be in office that day so we need to reschedule

## 2024-05-30 ENCOUNTER — TELEPHONE (OUTPATIENT)
Dept: SURGERY | Facility: CLINIC | Age: 34
End: 2024-05-30
Payer: COMMERCIAL

## 2024-05-30 NOTE — TELEPHONE ENCOUNTER
PT CALLED BACK TO [facility]. PLEASE CALL [phone]      Attempted to Warm Transfer but, unable to reach the Practice:

## 2024-06-05 NOTE — PROGRESS NOTES
"[facility]     Referring Provider: Brenda Jacob DO     Chief complaint: breast mass and nipple discharge     HPI: Ms. Stephanie Durham is a 31 yo woman, seen at the request of Brenda Jacob DO, for a new diagnosis of nipple discharge and right breast mass.      She denies any breast lumps, pain or skin changes.  2017 needle biopsy that showed left lactating adenoma which was not removed.      She reports that she is otherwise healthy and does not take any medications.     She has a family history of breast cancer in her Maternal grandmother aged 82.     She was by herself in clinic today.  Today presenting with concerns regarding abnormal imaging and right outer quadrant breast pain since .  The pain comes and goes and gets worse with her period. She has a hx of brady milky discharge for the past 9 years.  Very small amount, makes nipples crusty. Not expressing, not bloody, thinks from one duct        I personally reviewed her records and summarized her relevant breast history/imagin2022 Saw Dr. Jacob  \"Stephanie Durham is a 32 y.o. patient who presents as a new pt with complaints of a right breast mass noted in 2022. Pt reports the mass is persistent but swells during her menstrual cycles. The mass is tender. She has hx of a left lactating adenoma that was biopsied:2017. Maternal GM with breast cancer at age 82. : pt stopped breast feeding 2020. Pt reporting bilateral milky discharge- spontaneous. She states she had a work up in 2018 that was negative. She reports she has not noted any discharge in the last several months. Pt is not on any contraception- she is ok with pregnancy. \"  \"No mass noted of right outer breast were patient's having her complaints.  Costochondritis noted as she is very tender between her ribs in this area.  Patient's palpable concerns are equal on the right and the left side and no dominant masses noted.  With manipulation of the nipple a milky " "discharge was released from the left nipple from 1 duct\"    9/21/2022   TSH 1.050  Prolactin 6.9    10/20/2022 diagnostic bilateral mammogram and bilateral limited ultrasound at [facility]  FINDINGS:  There are scattered areas of fibroglandular density.   . In the left upper outer quadrant there is a 10.5 x 7.3 mm oval nodule  and  in the axilla high on image there is a biopsy marker clip  associated with a 11 mm nodule.  Targeted ultrasound of the left breast was performed as well as both  retroareolar regions. No retroareolar abnormalities are identified on  either side. 1 cm from the nipple on the left side, there may be a 7 mm  3 mm x 3 mm well-circumscribed nodule but this could also represent a  fat lobule. This Is uniform and nonshadowing. Another similar lesion is  noted at 1:00, 2 cm from nipple measuring 8 x 3 x 7 mm. It is also very  similar to the adjacent fat lobules.  In the axillary tail region there is a possible lesion identified  measuring 13 x 4 x 14 mm. This also is very similar to the adjacent fat  lobule  IMPRESSION:  Right breast single view mammogram and retroareolar  ultrasound was negative  Left breast single view mammogram shows 2 nodular densities. One is in  the far left upper-outer quadrant and the other in the axilla. The  axillary abnormality has a biopsy clip associated with it. No suspicious  ultrasound findings are identified in the left breast at the sites.  Possible small fibroadenomas versus fat lobules are identified 1:00 near  the nipple and in the left upper-outer quadrant.  Recommend 6 month follow-up left MLO view and repeat left breast  ultrasound  BI-RADS CATEGORY 3    2/17/2023   Patient presented to the office today for routine follow-up.  She has been using vitamin E daily which has helped significantly with her breast pain.  She is still experiencing breast pain but only around her period.  Also she denies any more nipple discharge since before being seen " the last time that she was here.  She already has imaging set up from May to follow-up on a BI-RADS 3.  She has no new breast concerns today.    11/16/2023   Patient presenting to the office today for routine follow-up.  She has no issues with breast pain anymore her nipple discharge has completely stopped unless she manipulates the left nipple and it is a white discharge.  She does have concerns regarding some swollen tender lymph nodes under her right arm last month they seem to be under her left arm they swell up or tender and then they go away within a week.    6/6/2024 interval history  Patient presenting to the office today for routine follow-up.  Her breast pain has still completely resolved.  She is no longer having any nipple discharge.  She is due for left limited ultrasound.  She has no new breast complaints or concerns today.    Review of Systems   All other systems reviewed and are negative.      Medications:    Current Outpatient Medications:     metroNIDAZOLE (Metrogel) 1 % gel, Apply  topically to the appropriate area as directed Daily., Disp: 60 g, Rfl: 5    multivitamin with minerals tablet tablet, Take 1 tablet by mouth Daily., Disp: , Rfl:     Allergies:  Allergies   Allergen Reactions    Adhesive Tape Hives     bandaid brand       Medical history:  Past Medical History:   Diagnosis Date    Anemia     Anxiety     Headache     History of medical problems     Hypertension     Urinary tract infection     Visual impairment        Surgical History:  Past Surgical History:   Procedure Laterality Date    BREAST SURGERY Left 2017    Left Breast Biopsy       Family History:  Family History   Problem Relation Age of Onset    Mental illness Mother     Arthritis Mother     Anxiety disorder Mother     Cancer Father     Learning disabilities Brother     Thyroid disease Maternal Grandmother     Breast cancer Maternal Grandmother 82    Lung cancer Maternal Grandmother        Social History:   Social History      Socioeconomic History    Marital status:    Tobacco Use    Smoking status: Never     Passive exposure: Never    Smokeless tobacco: Never   Vaping Use    Vaping status: Never Used   Substance and Sexual Activity    Alcohol use: Never    Drug use: Never    Sexual activity: Yes     Partners: Male     Birth control/protection: None     Patient drink 44 ounces of caffeine a day.        GYNECOLOGIC HISTORY:   . P: 3. AB: 2.  Last menstrual period: 10/27/2022  Age at menarche: 14  Age at first childbirth: 23  Lactation/How lon months  Age at menopause: premenopausal  Total years of oral contraceptive use: 2 years  Total years of hormone replacement therapy: 0      Physical Exam  There were no vitals filed for this visit.      ECOG 0 - Asymptomatic  General: NAD, well appearing  Psych: a&o x 3, normal mood and affect  Eyes: EOMI, no scleral icterus  ENMT: neck supple without masses or thyromegaly, mucus membranes moist  Resp: normal effort, CTAB  CV: RRR, no murmurs, no edema   GI: soft, NT, ND  MSK: normal gait, normal ROM in bilateral shoulders  Lymph nodes:  no cervical, supraclavicular or axillary lymphadenopathy  Breast: symmetric, small  Right:  No visible abnormalities on inspection while seated, with arms raised or hands on hips. No masses, skin changes, or nipple abnormalities. No discharge unable to palpate any lymph nodes in axilla  Left:  No visible abnormalities on inspection while seated, with arms raised or hands on hips. No skin changes, or nipple abnormalities. No discharge unable to palpate any lymph nodes in axilla 1:00 0.5 mass, 1:00 2cmfn 0.5cm mass, 1:00 8cmfn 1cm mass    Imagin2023 left diagnostic mammogram and left limited breast ultrasound at [facility]  FINDINGS: Unilateral left digital CC mammographic and MLO mammographic  and Tomosynthesis images were obtained. Comparison is made to prior  mammography dated 10/20/2022. The parenchyma of the left breast  demonstrates a  heterogeneously dense pattern which lessens the  sensitivity. There is a stable area of focal asymmetry that measures on  the order of 1.3 cm in greatest dimension that is seen in the axillary  tail region of the left breast. No architectural distortion or  suspicious calcifications are visualized. There is no evidence for skin  thickening, nipple retraction or axillary adenopathy.  ULTRASOUND: Targeted sonographic evaluation of the left breast was  performed through the 1-o'clock position in a retroareolar location, the  1-o'clock position on the order of 2 cm from the nipple and the  1-o'clock position on the order of 8 cm from the nipple in the axillary  tail region. Comparison is made to prior breast sonography dated  10/20/2022.  In the retroareolar region at the 1-o'clock position there is a 0.6 x  0.7 x 0.3 cm oval circumscribed hypoechoic mass that previously measured  0.7 x 0.8 x 0.3 cm. No detectable internal vascularity is appreciated.  The imaging features suggest a benign fibroadenoma.  At the 1-o'clock position on the order of 2 cm from the nipple there is  a 0.9 x 0.8 x 0.3 cm oval circumscribed hypoechoic mass that previously  measured 0.8 x 0.7 x 0.3 cm. The imaging features suggest a benign  fibroadenoma.  In the axillary tail region at the 1-o'clock position on the order of 8  cm from the nipple there is a 1.5 x 1.5 x 0.4 cm oval circumscribed  hypoechoic masslike region that previously measured 1.4 x 1.4 x 0.4 cm.  The imaging features are most consistent with a benign fibroadenoma  versus an area of prominent fibrous tissue.  IMPRESSION:  There are 3 stable probable benign lesions in the left breast at the  1-o'clock axis as described. Continue sonographic follow-up of left  breast in 6-9 month intervals is recommended through October 2024 to  demonstrate two-year stability.  BI-RADS Category 3: Probably benign finding. Short-term sonographic  follow-up is recommended.    11/10/2023 left Limited  breast ultrasound at [facility]  FINDINGS: Ultrasound evaluation again demonstrates a small ovoid solid  nodule in retroareolar region at 1 o'clock measuring 7 x 3 x 6 mm and  showing no change from the previous exams. There is a similar solid  appearing nodule located at 1 o'clock, 2 cm from the nipple that  measures 6 x 5 x 3 mm compared to a size of 9 x 8 x 3 mm on the previous  exam. There is a somewhat elongated ovoid solid nodule in the left  axillary tail located at 1 o'clock, 8 cm from the nipple that measures  1.5 x 1.5 x 0.4 cm and is unchanged.  CONCLUSION: Benign directed left breast ultrasound showing 3  benign-appearing solid nodules as described above. 1 of these is smaller  since 05/12/2023. The others are unchanged. A followup directed left  breast ultrasound in 1 year might be considered to ensure 2-year  stability. BI-RADS 2. Benign.      Assessment:  34 y.o. F with a new diagnosis of breast pain- resolved  2.nipple discharge- resolved  3. Abnormal imaging  4. Family history of breast cancer    Discussion:  1. We discussed types of nipple discharge. We discussed that physiologic discharge is usually bilateral, nonspontaneous, multi-ductal, and milky, but can be yellow, green or brown. That this can sometimes be associated with elevated prolactin levels and that elevated prolactin levels can have multiple causes (pregnancy, pituitary, thyroid, medications, etc).    We discussed that pathologic nipple discharge usually is unilateral, spontaneous, persistent, comes from a single duct, is bloody, clear, or serosanguinous, or is associated with a palpable or radiological evident breast mass.   I believe this is physiologic and no MRI work-up is needed at this time.  2. I explained the concept of a BI-RADS 3 designation and the process of imaging surveillance. We discussed that a BI-RADS 3 designation describes an imaging finding that is 98-99% likely to represent a benign process. We discussed that the most  common management of a BI-RADS 3 finding is initial 6 month imaging surveillance and that the entire period of imaging surveillance can often take 2 years.   3. We had a lengthy discussion about breast pain and how it can sometimes be difficult to treat. We discussed that this is often related to hormonal changes. Caffeine and nicotine can also play a role in breast pain. We also discussed the importance of wearing a good supportive bra at all times including bedtime.  We discussed additional therapies such as vitamin E supplementation and Primrose oil, and that these may or may not be useful. We discussed using OTC tylenol or ibuprofen for pain control. Finally, we discussed the use of topical NSAID creams.    4. Lifetime risk per TC model is 18.1%    Plan:  1. Take Vitamin E, 200 U, twice daily or evening primrose oil once or twice daily   3. Continue to reduce caffeine intake.   4. May use OTC tylenol or ibuprofen, or topical products, for pain control.   5.  Lt limited us in nov followed by exam       GISSEL Lee    I have spent 20 min in face to face time with the patient and in chart review    CC:  DO Pita Sandy Adam, MD

## 2024-06-06 ENCOUNTER — OFFICE VISIT (OUTPATIENT)
Dept: SURGERY | Facility: CLINIC | Age: 34
End: 2024-06-06
Payer: COMMERCIAL

## 2024-06-06 VITALS
DIASTOLIC BLOOD PRESSURE: 64 MMHG | WEIGHT: 115 LBS | HEART RATE: 98 BPM | HEIGHT: 69 IN | BODY MASS INDEX: 17.03 KG/M2 | OXYGEN SATURATION: 100 % | SYSTOLIC BLOOD PRESSURE: 102 MMHG

## 2024-06-06 DIAGNOSIS — R92.8 ABNORMAL FINDING ON BREAST IMAGING: Primary | ICD-10-CM

## 2024-06-06 PROCEDURE — 99213 OFFICE O/P EST LOW 20 MIN: CPT | Performed by: NURSE PRACTITIONER

## 2024-10-07 ENCOUNTER — OFFICE VISIT (OUTPATIENT)
Dept: OBSTETRICS AND GYNECOLOGY | Facility: CLINIC | Age: 34
End: 2024-10-07
Payer: COMMERCIAL

## 2024-10-07 VITALS
DIASTOLIC BLOOD PRESSURE: 68 MMHG | BODY MASS INDEX: 17.92 KG/M2 | WEIGHT: 121 LBS | SYSTOLIC BLOOD PRESSURE: 102 MMHG | HEIGHT: 69 IN

## 2024-10-07 DIAGNOSIS — N92.6 MISSED MENSES: ICD-10-CM

## 2024-10-07 DIAGNOSIS — N96 RECURRENT PREGNANCY LOSS: Primary | ICD-10-CM

## 2024-10-07 DIAGNOSIS — O14.90 PRE-ECLAMPSIA, ANTEPARTUM: ICD-10-CM

## 2024-10-07 LAB
B-HCG UR QL: POSITIVE
BILIRUB BLD-MCNC: NEGATIVE MG/DL
CLARITY, POC: CLEAR
COLOR UR: ABNORMAL
EXPIRATION DATE: ABNORMAL
GLUCOSE UR STRIP-MCNC: NEGATIVE MG/DL
INTERNAL NEGATIVE CONTROL: ABNORMAL
INTERNAL POSITIVE CONTROL: ABNORMAL
KETONES UR QL: NEGATIVE
LEUKOCYTE EST, POC: NEGATIVE
Lab: ABNORMAL
NITRITE UR-MCNC: NEGATIVE MG/ML
PH UR: 5 [PH] (ref 5–8)
PROT UR STRIP-MCNC: NEGATIVE MG/DL
RBC # UR STRIP: ABNORMAL /UL
SP GR UR: 1.03 (ref 1–1.03)
UROBILINOGEN UR QL: NORMAL

## 2024-10-07 NOTE — PROGRESS NOTES
"Subjective     Chief Complaint   Patient presents with    Pregnancy Problem     Bleeding in early pregnancy       Stephanie Durham is a 34 y.o.  whose LMP is Patient's last menstrual period was 08/15/2024.. She is an established patient but new to me.  She presents with c/o a miscarriage. She was trying to get pregnant for approx 5 years. She has 3 living children: 10, 7 and 3.  She reports the bleeding started on Friday. The bleeding picked up and she reports passing the pregnancy. States, she saw the placenta, sac and fetus. She continues to have bleeding. Reports the bleeding has slowed and she is passing small blood clots. The pain is mild now compared to this past weekend. She is A+ per her report.     She reports she has never had a work up for her pregnancy or difficulty with conceiving. Her partner has not had semen analysis but his testosterone level is normal per her reports. She denies h/o DVT. She denies history of clotting/bleeding disorder.     Her LNMP was 8/15/24= 7.4 weeks.     She has not sought care for this miscarriage until today. In her previous miscarriages, she has never had to have a D&C.     HPI    HPI    The following portions of the patient's history were reviewed and updated as appropriate:vital signs, allergies, current medications, past medical history, past social history, past surgical history, and problem list      Review of Systems     Review of Systems   Constitutional: Negative.    Genitourinary:  Positive for pelvic pain and vaginal bleeding.       Objective      /68   Ht 175.3 cm (69.02\")   Wt 54.9 kg (121 lb)   LMP 08/15/2024   BMI 17.86 kg/m²     Physical Exam    Physical Exam  Vitals and nursing note reviewed.   Constitutional:       Appearance: Normal appearance.   Skin:     General: Skin is warm and dry.   Neurological:      General: No focal deficit present.      Mental Status: She is alert and oriented to person, place, and time.   Psychiatric:         " Mood and Affect: Mood normal.         Behavior: Behavior normal.         Lab Review   Labs: Urine pregnancy test, Urinalysis - with micro     Imaging   Ultrasound - Pelvic Vaginal    Assessment  Diagnoses and all orders for this visit:    1. Recurrent pregnancy loss (Primary)  -     Ambulatory Referral to [facility]/Perinatology    2. bleeding in early pregnancy  -     POC Urinalysis Dipstick  -     POC Pregnancy, Urine        Assessment/Plan     Bleeding in early pregnancy- Per report she had SAB at home. Disc US findings, no D&C at this time. Rev warn s/s. Continue PNV.   RPL and H/O preeclampsia- SAB x 3 and h/o preeclampsia. Rec RPL panel once quant HCG is neg. Enc safe sex until all work up has been completed. Plan to follow quant HCGs until zero. Partner should consider semen analysis. Ref to [facility] for preconceptual counseling and further opinion on RPL. No D&C or genetics with previous losses. Consider baby ASA and vaginal progesterone in future pregnancies.     RTO for labs in 1 week     Lorena Templeton, GISSEL  10/7/2024

## 2024-10-08 LAB
ABO GROUP BLD: NORMAL
BLD GP AB SCN SERPL QL: NEGATIVE
HCG INTACT+B SERPL-ACNC: 64.8 MIU/ML
RH BLD: POSITIVE

## 2024-10-18 ENCOUNTER — OFFICE VISIT (OUTPATIENT)
Dept: OBSTETRICS AND GYNECOLOGY | Facility: CLINIC | Age: 34
End: 2024-10-18
Payer: COMMERCIAL

## 2024-10-18 VITALS
BODY MASS INDEX: 17.92 KG/M2 | TEMPERATURE: 98.6 F | DIASTOLIC BLOOD PRESSURE: 73 MMHG | HEIGHT: 69 IN | WEIGHT: 121 LBS | HEART RATE: 60 BPM | SYSTOLIC BLOOD PRESSURE: 122 MMHG

## 2024-10-18 DIAGNOSIS — O03.9 EARLY PREGNANCY LOSS: Primary | ICD-10-CM

## 2024-10-18 RX ORDER — PRENATAL VIT NO.126/IRON/FOLIC 28MG-0.8MG
TABLET ORAL DAILY
COMMUNITY

## 2024-10-18 NOTE — PROGRESS NOTES
Maternal Fetal Medicine Pre-conception Consultation Note    Referring Physician: Dr. Dudley    Indication for consultation:  Multiple miscarriages    HPI:  Pt is a 34y.o.  presenting today for pre-conception counseling.     Patient has a significant history of:  Multiple miscarriages with the most recent being this month.     Today, patient has no complaints.  Continuing to feel sad following her most recent loss.  She reports that she has been trying to get pregnant for 5 years without success.    She denies any other medical problems.  She does not take any medications.  No prior surgeries except for a breast biopsy that was benign in 2016.  She denies any history of blood clots, stroke, severe growth restriction prior pregnancy, or preeclampsia that was severe.    Past Medical History:   Diagnosis Date    Anemia     Anxiety     panic attacks    Breast lump     on left    Headache     History of medical problems     Hypertension     preeclapmsia with pregnancy    Urinary tract infection     Visual impairment     corneal abrasions     Past Surgical History:   Procedure Laterality Date    BREAST SURGERY Left 2017    Left Breast Biopsy     The family history of non contributory   Social hx: She denies any alcohol tobacco or drug use    Current Outpatient Medications:     prenatal vitamin (prenatal, CLASSIC, vitamin) tablet, Take  by mouth Daily., Disp: , Rfl:     multivitamin with minerals tablet tablet, Take 1 tablet by mouth Daily., Disp: , Rfl:    Allergies   Allergen Reactions    Adhesive Tape Hives     bandaid brand        Review of Systems  As above in HPI     Vitals:    10/18/24 0818   BP: 122/73   Pulse: 60   Temp: 98.6 °F (37 °C)      General: No acute distress  Respiratory: No increased work of breathing  Cardiac: regular rate  Extremities: No significant edema  Neuro/psych: Alert and oriented, appropriate mood appropriately tearful      ASSESSMENT     Pt is a 34-year-old -0-3-3 presenting for  preconception counseling for multiple miscarriages.    We reviewed that given that all of her miscarriages have been  by a term delivery she does not meet the criteria for recurrent pregnancy loss.  Recognize that this does not ease the pain that she is currently feeling.  Offered condolences.  We discussed that recurrent pregnancy loss is defined as consecutive pregnancy loss is greater than 2 by some societies or greater within 3 and others.  We did discuss that the most common etiology for early pregnancy loss this genetic differences.  We reviewed that genetic testing can be performed on products of conception.  We discussed that these continue to be collected at home but then brought into a facility to send to the lab.  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  Discussed that this can sometimes provide with a specific etiology.  Additionally, we discussed the option of APLAS testing.  We reviewed the current guidelines for testing require a clinical criteria in addition to laboratory criteria.  We reviewed that at this time she does not meet the clinical criteria.  However, given that she has had 3 losses although not consecutive we could consider testing.  Discussed that this is not a likely etiology given that she has had uncomplicated pregnancies in the interim and APLAS is associated with significant risk in pregnancy.  We reviewed the treatment for APLAS is Lovenox in addition to aspirin.  She declines proceeding with testing at this time.  However, we discussed starting low-dose aspirin at positive pregnancy test in future pregnancies.  Discussed that this could have an increased risk of subchorionic hemorrhage.  If she were to have bleeding I would recommend discontinuation of aspirin at that time.  Is not recommended to test for heritable coagulation disorders for the indication of recurrent pregnancy loss without a prior DVT.      Discussed progesterone therapy and  that it does not appear to be necessarily beneficial for the treatment of pregnancy loss.  However, this is a very low risk medication with the most common side effects being vaginal irritation and vaginal discharge.  This is often considered in the first trimester.  This can be started with positive pregnancy test discontinued at the end of the first trimester.      Last we discussed that it took her 5 years to get pregnant with her most recent pregnancy.  Discussed that given her history it would be very reasonable to refer to [facility] at 6 months she does not achieve pregnancy.  She was counseled that she can continue to try to become pregnant starting now given that she has had a negative urine pregnancy test.      Counseling and Recommendations  Start low-dose aspirin at positive pregnancy test, discontinue if she develops bleeding.  Start 200 mg vaginal progesterone nightly at positive pregnancy test and discontinue at the end of the first trimester  If she has another pregnancy loss send products for genetic testing.  This can be completed within an [facility] through [facility]  She declines APLAS workup at this time.  Testing for inheritable coagulopathies is not recommended  Greater than 6 months without pregnancy recommend referral to [facility] for infertility workup.  Continue prenatal vitamin    Encounter Diagnosis   Name Primary?    Early pregnancy loss Yes       I spent 45 minutes caring for this patient on this date of service. This time includes time spent by me in the following activities: preparing for the visit, reviewing tests, obtaining and/or reviewing a separately obtained history, performing a medically appropriate examination and/or evaluation, counseling and educating the patient/family/caregiver and independently interpreting results and communicating that information with the patient/family/caregiver with greater than 50% spent in counseling and coordination of care.     Kelsi Montes MD   Maternal Fetal  [facility]  Office: [phone] o

## 2024-10-18 NOTE — PROGRESS NOTES
Patient here for preconception counseling appointment with Dr. Montes. Most recent miscarriage was 10/4/2024 at 7weeks. Patient reports having a good support system at home. Denies any complaints today. Dr. Montes aware. No current follow up appointment with OB at this time.

## 2024-10-18 NOTE — LETTER
October 18, 2024     Pedro Dudley DO  [address] [address]    Patient: Stephanie Durham   YOB: 1990   Date of Visit: 10/18/2024       Dear Pedro Dudley DO:    Thank you for referring Stephanie Durham to me for evaluation. Below are the relevant portions of my assessment and plan of care.    Encounter Diagnosis and Orders:  Diagnoses and all orders for this visit:    1. Early pregnancy loss (Primary)      If you have questions, please do not hesitate to call me. I look forward to following Stephanie along with you.         Sincerely,        Kelsi Montes MD        CC: No Recipients

## 2024-11-14 ENCOUNTER — HOSPITAL ENCOUNTER (OUTPATIENT)
Dept: ULTRASOUND IMAGING | Facility: HOSPITAL | Age: 34
Discharge: HOME | End: 2024-11-14
Admitting: NURSE PRACTITIONER
Payer: COMMERCIAL

## 2024-11-14 PROCEDURE — 76642 ULTRASOUND BREAST LIMITED: CPT

## 2024-11-20 NOTE — PROGRESS NOTES
"[facility]     Referring Provider: Brenda Jacob DO     Chief complaint: breast mass and nipple discharge     HPI: Ms. Stephanie Durham is a 33 yo woman, seen at the request of Brenda Jacob DO, for a new diagnosis of nipple discharge and right breast mass.      She denies any breast lumps, pain or skin changes.  2017 needle biopsy that showed left lactating adenoma which was not removed.      She reports that she is otherwise healthy and does not take any medications.     She has a family history of breast cancer in her Maternal grandmother aged 82.     She was by herself in clinic today.  Today presenting with concerns regarding abnormal imaging and right outer quadrant breast pain since .  The pain comes and goes and gets worse with her period. She has a hx of brady milky discharge for the past 9 years.  Very small amount, makes nipples crusty. Not expressing, not bloody, thinks from one duct        I personally reviewed her records and summarized her relevant breast history/imagin2022 Saw Dr. Jacob  \"Stephanie Durham is a 32 y.o. patient who presents as a new pt with complaints of a right breast mass noted in 2022. Pt reports the mass is persistent but swells during her menstrual cycles. The mass is tender. She has hx of a left lactating adenoma that was biopsied:2017. Maternal GM with breast cancer at age 82. : pt stopped breast feeding 2020. Pt reporting bilateral milky discharge- spontaneous. She states she had a work up in 2018 that was negative. She reports she has not noted any discharge in the last several months. Pt is not on any contraception- she is ok with pregnancy. \"  \"No mass noted of right outer breast were patient's having her complaints.  Costochondritis noted as she is very tender between her ribs in this area.  Patient's palpable concerns are equal on the right and the left side and no dominant masses noted.  With manipulation of the nipple a milky " "discharge was released from the left nipple from 1 duct\"    9/21/2022   TSH 1.050  Prolactin 6.9    10/20/2022 diagnostic bilateral mammogram and bilateral limited ultrasound at [facility]  FINDINGS:  There are scattered areas of fibroglandular density.   . In the left upper outer quadrant there is a 10.5 x 7.3 mm oval nodule  and  in the axilla high on image there is a biopsy marker clip  associated with a 11 mm nodule.  Targeted ultrasound of the left breast was performed as well as both  retroareolar regions. No retroareolar abnormalities are identified on  either side. 1 cm from the nipple on the left side, there may be a 7 mm  3 mm x 3 mm well-circumscribed nodule but this could also represent a  fat lobule. This Is uniform and nonshadowing. Another similar lesion is  noted at 1:00, 2 cm from nipple measuring 8 x 3 x 7 mm. It is also very  similar to the adjacent fat lobules.  In the axillary tail region there is a possible lesion identified  measuring 13 x 4 x 14 mm. This also is very similar to the adjacent fat  lobule  IMPRESSION:  Right breast single view mammogram and retroareolar  ultrasound was negative  Left breast single view mammogram shows 2 nodular densities. One is in  the far left upper-outer quadrant and the other in the axilla. The  axillary abnormality has a biopsy clip associated with it. No suspicious  ultrasound findings are identified in the left breast at the sites.  Possible small fibroadenomas versus fat lobules are identified 1:00 near  the nipple and in the left upper-outer quadrant.  Recommend 6 month follow-up left MLO view and repeat left breast  ultrasound  BI-RADS CATEGORY 3    2/17/2023   Patient presented to the office today for routine follow-up.  She has been using vitamin E daily which has helped significantly with her breast pain.  She is still experiencing breast pain but only around her period.  Also she denies any more nipple discharge since before being seen " the last time that she was here.  She already has imaging set up from May to follow-up on a BI-RADS 3.  She has no new breast concerns today.    11/16/2023   Patient presenting to the office today for routine follow-up.  She has no issues with breast pain anymore her nipple discharge has completely stopped unless she manipulates the left nipple and it is a white discharge.  She does have concerns regarding some swollen tender lymph nodes under her right arm last month they seem to be under her left arm they swell up or tender and then they go away within a week.    6/6/2024   Patient presenting to the office today for routine follow-up.  Her breast pain has still completely resolved.  She is no longer having any nipple discharge.  She is due for left limited ultrasound.  She has no new breast complaints or concerns today.    11/21/2024 interval history  Patient presenting to the office today for routine follow-up.  On limb 14 2024 she had a left limited breast ultrasound that resulted as BI-RADS 2 taking her out of surveillance.  Patient has no other breast complaints or concerns today.    Review of Systems   All other systems reviewed and are negative.      Medications:    Current Outpatient Medications:     multivitamin with minerals tablet tablet, Take 1 tablet by mouth Daily., Disp: , Rfl:     prenatal vitamin (prenatal, CLASSIC, vitamin) tablet, Take  by mouth Daily., Disp: , Rfl:     Allergies:  Allergies   Allergen Reactions    Adhesive Tape Hives     bandaid brand       Medical history:  Past Medical History:   Diagnosis Date    Anemia     Anxiety     panic attacks    Breast lump     on left    Headache     History of medical problems     Hypertension     preeclapmsia with pregnancy    Urinary tract infection     Visual impairment     corneal abrasions       Surgical History:  Past Surgical History:   Procedure Laterality Date    BREAST SURGERY Left 2017    Left Breast Biopsy       Family History:  Family  History   Problem Relation Age of Onset    Mental illness Mother     Arthritis Mother     Anxiety disorder Mother     Cancer Father     Learning disabilities Brother     Thyroid disease Maternal Grandmother     Breast cancer Maternal Grandmother 82    Lung cancer Maternal Grandmother        Social History:   Social History     Socioeconomic History    Marital status:    Tobacco Use    Smoking status: Never     Passive exposure: Never    Smokeless tobacco: Never   Vaping Use    Vaping status: Never Used   Substance and Sexual Activity    Alcohol use: Never    Drug use: Never    Sexual activity: Yes     Partners: Male     Birth control/protection: None     Patient drink 44 ounces of caffeine a day.        GYNECOLOGIC HISTORY:   . P: 3. AB: 2.  Last menstrual period: 10/27/2022  Age at menarche: 14  Age at first childbirth: 23  Lactation/How lon months  Age at menopause: premenopausal  Total years of oral contraceptive use: 2 years  Total years of hormone replacement therapy: 0      Physical Exam  There were no vitals filed for this visit.      ECOG 0 - Asymptomatic  General: NAD, well appearing  Psych: a&o x 3, normal mood and affect  Eyes: EOMI, no scleral icterus  ENMT: neck supple without masses or thyromegaly, mucus membranes moist  Resp: normal effort, CTAB  CV: RRR, no murmurs, no edema   GI: soft, NT, ND  MSK: normal gait, normal ROM in bilateral shoulders  Lymph nodes:  no cervical, supraclavicular or axillary lymphadenopathy  Breast: symmetric, small  Right:  No visible abnormalities on inspection while seated, with arms raised or hands on hips. No masses, skin changes, or nipple abnormalities. No discharge unable to palpate any lymph nodes in axilla  Left:  No visible abnormalities on inspection while seated, with arms raised or hands on hips. No skin changes, or nipple abnormalities. No discharge unable to palpate any lymph nodes in axilla 1:00 0.5 mass, 1:00 2cmfn 0.5cm mass, 1:00 8cmfn 1cm  mass    Imagin2023 left diagnostic mammogram and left limited breast ultrasound at [facility]  FINDINGS: Unilateral left digital CC mammographic and MLO mammographic  and Tomosynthesis images were obtained. Comparison is made to prior  mammography dated 10/20/2022. The parenchyma of the left breast  demonstrates a heterogeneously dense pattern which lessens the  sensitivity. There is a stable area of focal asymmetry that measures on  the order of 1.3 cm in greatest dimension that is seen in the axillary  tail region of the left breast. No architectural distortion or  suspicious calcifications are visualized. There is no evidence for skin  thickening, nipple retraction or axillary adenopathy.  ULTRASOUND: Targeted sonographic evaluation of the left breast was  performed through the 1-o'clock position in a retroareolar location, the  1-o'clock position on the order of 2 cm from the nipple and the  1-o'clock position on the order of 8 cm from the nipple in the axillary  tail region. Comparison is made to prior breast sonography dated  10/20/2022.  In the retroareolar region at the 1-o'clock position there is a 0.6 x  0.7 x 0.3 cm oval circumscribed hypoechoic mass that previously measured  0.7 x 0.8 x 0.3 cm. No detectable internal vascularity is appreciated.  The imaging features suggest a benign fibroadenoma.  At the 1-o'clock position on the order of 2 cm from the nipple there is  a 0.9 x 0.8 x 0.3 cm oval circumscribed hypoechoic mass that previously  measured 0.8 x 0.7 x 0.3 cm. The imaging features suggest a benign  fibroadenoma.  In the axillary tail region at the 1-o'clock position on the order of 8  cm from the nipple there is a 1.5 x 1.5 x 0.4 cm oval circumscribed  hypoechoic masslike region that previously measured 1.4 x 1.4 x 0.4 cm.  The imaging features are most consistent with a benign fibroadenoma  versus an area of prominent fibrous tissue.  IMPRESSION:  There are 3 stable probable benign lesions in  the left breast at the  1-o'clock axis as described. Continue sonographic follow-up of left  breast in 6-9 month intervals is recommended through October 2024 to  demonstrate two-year stability.  BI-RADS Category 3: Probably benign finding. Short-term sonographic  follow-up is recommended.    11/10/2023 left Limited breast ultrasound at [facility]  FINDINGS: Ultrasound evaluation again demonstrates a small ovoid solid  nodule in retroareolar region at 1 o'clock measuring 7 x 3 x 6 mm and  showing no change from the previous exams. There is a similar solid  appearing nodule located at 1 o'clock, 2 cm from the nipple that  measures 6 x 5 x 3 mm compared to a size of 9 x 8 x 3 mm on the previous  exam. There is a somewhat elongated ovoid solid nodule in the left  axillary tail located at 1 o'clock, 8 cm from the nipple that measures  1.5 x 1.5 x 0.4 cm and is unchanged.  CONCLUSION: Benign directed left breast ultrasound showing 3  benign-appearing solid nodules as described above. 1 of these is smaller  since 05/12/2023. The others are unchanged. A followup directed left  breast ultrasound in 1 year might be considered to ensure 2-year  stability. BI-RADS 2. Benign.    11/14/2024 Left limited breast US  Targeted sonographic evaluation of the left breast was performed for  follow-up. At 1:00 retroareolar, there is a 0.6 x 0.3 x 0.7 cm mass,  which is similar dating back to 10/20/2022, previously 0.8 x 0.2 x 0.7  cm at that time. At 1:00, 2 cm from the nipple, there is a 0.6 x 0.3 x  0.5 cm mass, which is smaller from 2022, previously 0.8 x 0.3 x 0.7 cm  at that time. At 1:00, 8 cm from the nipple in the axillary tail, there  is a 1.6 x 0.4 x 1.6 cm mass versus prominent fat lobule, which is  similar, previously 1.4 x 0.4 x 1.4 cm. These can be considered benign.  Recommend annual screening mammogram beginning at age 40 or sooner as  direct by clinical risk assessment.  BI-RADS Category 2: Benign    Assessment:  34 y.o. F with  a new diagnosis of breast pain-  resolved  2.nipple discharge- resolved  3. Abnormal imaging- resolved   4. Family history of breast cancer    Discussion:  1. We discussed types of nipple discharge. We discussed that physiologic discharge is usually bilateral, nonspontaneous, multi-ductal, and milky, but can be yellow, green or brown. That this can sometimes be associated with elevated prolactin levels and that elevated prolactin levels can have multiple causes (pregnancy, pituitary, thyroid, medications, etc).    We discussed that pathologic nipple discharge usually is unilateral, spontaneous, persistent, comes from a single duct, is bloody, clear, or serosanguinous, or is associated with a palpable or radiological evident breast mass.   I believe this is physiologic and no MRI work-up is needed at this time.  2. I explained the concept of a BI-RADS 3 designation and the process of imaging surveillance. We discussed that a BI-RADS 3 designation describes an imaging finding that is 98-99% likely to represent a benign process. We discussed that the most common management of a BI-RADS 3 finding is initial 6 month imaging surveillance and that the entire period of imaging surveillance can often take 2 years.   3. We had a lengthy discussion about breast pain and how it can sometimes be difficult to treat. We discussed that this is often related to hormonal changes. Caffeine and nicotine can also play a role in breast pain. We also discussed the importance of wearing a good supportive bra at all times including bedtime.  We discussed additional therapies such as vitamin E supplementation and Primrose oil, and that these may or may not be useful. We discussed using OTC tylenol or ibuprofen for pain control. Finally, we discussed the use of topical NSAID creams.    4. Lifetime risk per TC model is 18.1%    Plan:  1.  Considering her imaging is stable I am not giving her a follow-up in the office.  I need her to do monthly  self breast exams and if any of these nodules grow she needs to contact me so I can do an exam and do further imaging.  Patient verbalizes understanding.  GISSEL Lee    I have spent 20 min in face to face time with the patient and in chart review    CC:  DO Pita Sandy Adam, MD

## 2024-11-21 ENCOUNTER — OFFICE VISIT (OUTPATIENT)
Dept: SURGERY | Facility: CLINIC | Age: 34
End: 2024-11-21
Payer: COMMERCIAL

## 2024-11-21 VITALS
HEIGHT: 69 IN | WEIGHT: 121 LBS | OXYGEN SATURATION: 99 % | SYSTOLIC BLOOD PRESSURE: 118 MMHG | DIASTOLIC BLOOD PRESSURE: 76 MMHG | BODY MASS INDEX: 17.92 KG/M2 | HEART RATE: 68 BPM | RESPIRATION RATE: 18 BRPM

## 2024-11-21 DIAGNOSIS — R92.8 ABNORMAL FINDING ON BREAST IMAGING: Primary | ICD-10-CM

## 2024-11-21 PROCEDURE — 99213 OFFICE O/P EST LOW 20 MIN: CPT | Performed by: NURSE PRACTITIONER

## 2025-03-03 ENCOUNTER — OFFICE VISIT (OUTPATIENT)
Dept: OBSTETRICS AND GYNECOLOGY | Facility: CLINIC | Age: 35
End: 2025-03-03
Payer: COMMERCIAL

## 2025-03-03 VITALS
WEIGHT: 122 LBS | BODY MASS INDEX: 18.07 KG/M2 | DIASTOLIC BLOOD PRESSURE: 62 MMHG | HEIGHT: 69 IN | SYSTOLIC BLOOD PRESSURE: 104 MMHG

## 2025-03-03 DIAGNOSIS — Z32.01 POSITIVE URINE PREGNANCY TEST: Primary | ICD-10-CM

## 2025-03-03 DIAGNOSIS — N92.6 MISSED MENSES: ICD-10-CM

## 2025-03-03 DIAGNOSIS — O14.90 PRE-ECLAMPSIA, ANTEPARTUM: ICD-10-CM

## 2025-03-03 DIAGNOSIS — N96 RECURRENT PREGNANCY LOSS: ICD-10-CM

## 2025-03-03 LAB
B-HCG UR QL: POSITIVE
BILIRUB BLD-MCNC: NEGATIVE MG/DL
CLARITY, POC: CLEAR
COLOR UR: YELLOW
EXPIRATION DATE: ABNORMAL
GLUCOSE UR STRIP-MCNC: NEGATIVE MG/DL
INTERNAL NEGATIVE CONTROL: ABNORMAL
INTERNAL POSITIVE CONTROL: ABNORMAL
KETONES UR QL: NEGATIVE
LEUKOCYTE EST, POC: NEGATIVE
Lab: ABNORMAL
NITRITE UR-MCNC: NEGATIVE MG/ML
PH UR: 5 [PH] (ref 5–8)
PROT UR STRIP-MCNC: NEGATIVE MG/DL
RBC # UR STRIP: NEGATIVE /UL
SP GR UR: 1.02 (ref 1–1.03)
UROBILINOGEN UR QL: NORMAL

## 2025-03-03 RX ORDER — ASPIRIN 81 MG/1
81 TABLET, CHEWABLE ORAL DAILY
COMMUNITY

## 2025-03-03 NOTE — PROGRESS NOTES
Confirmation of pregnancy     Chief Complaint   Patient presents with    Amenorrhea         Stephanie Durham is being seen today for evaluation of absence of menses. Due to her period being late, she tested for pregnancy and had + home UPT. She is a 34 y.o. . This problem is new to me, the examiner.       OB History          7    Para   3    Term   3            AB   3    Living   3         SAB   3    IAB        Ectopic        Molar        Multiple        Live Births   3                LNMP: 25  Confident with date: Yes  Taking prenatal vitamins: Yes. Needs RX: No  Planned pregnancy: Yes  Prior obstetric issues, potential pregnancy concerns: miscarriage and preeclampsia  Family history of genetic issues (includes FOB): denies   Varicella Hx: disease   Flu vaccine: did not have  COVID 19 vaccine: S/P vaccine. Booster vaccine: declined   History of STDs: denies   Current medications: PNV and baby ASA  Last pap smear: 10/23  Smoker: No  Drug or alcohol abuse: No  H/O physical, emotional or sexual abuse:denies  H/O mental health disorder: panic attack x 1   Prior testing for Cystic Fibrosis Carrier or Sickle Cell Trait- has not had    Prepregnancy BMI - Body mass index is 18.01 kg/m².    Past Medical History:   Diagnosis Date    Anemia     Anxiety     panic attacks    Breast lump     on left    Female infertility 2020    Gestational hypertension 2017    Headache     History of medical problems     Hypertension     preeclapmsia with pregnancy    Migraine 2008    Ovarian cyst 2015    Preeclampsia 2017    Recurrent pregnancy loss, antepartum condition or complication 2016    3 miscarriages    Urinary tract infection     Visual impairment     corneal abrasions       Past Surgical History:   Procedure Laterality Date    BREAST SURGERY Left 2017    Left Breast Biopsy    WISDOM TOOTH EXTRACTION           Current Outpatient Medications:     aspirin 81 MG chewable tablet, Chew 1 tablet Daily., Disp:  ", Rfl:     prenatal vitamin (prenatal, CLASSIC, vitamin) tablet, Take  by mouth Daily., Disp: , Rfl:     multivitamin with minerals tablet tablet, Take 1 tablet by mouth Daily. (Patient not taking: Reported on 3/3/2025), Disp: , Rfl:     Allergies   Allergen Reactions    Adhesive Tape Hives     bandaid brand       Social History     Socioeconomic History    Marital status:    Tobacco Use    Smoking status: Never     Passive exposure: Never    Smokeless tobacco: Never   Vaping Use    Vaping status: Never Used   Substance and Sexual Activity    Alcohol use: Never    Drug use: Never    Sexual activity: Yes     Partners: Male     Birth control/protection: None       Family History   Problem Relation Age of Onset    Mental illness Mother     Arthritis Mother     Anxiety disorder Mother     Cancer Father     Melanoma Father     Learning disabilities Brother     Thyroid disease Maternal Grandmother     Breast cancer Maternal Grandmother 82    Lung cancer Maternal Grandmother        Review of systems     Review of Systems   Constitutional:  Positive for fatigue.   Gastrointestinal:  Positive for nausea.   Genitourinary:  Positive for breast pain and menstrual problem.       Objective    /62   Ht 175.3 cm (69.02\")   Wt 55.3 kg (122 lb)   LMP 01/17/2025   BMI 18.01 kg/m²     Physical Exam  Constitutional:       Appearance: Normal appearance.   Musculoskeletal:         General: Normal range of motion.   Skin:     General: Skin is warm and dry.   Neurological:      General: No focal deficit present.      Mental Status: She is alert and oriented to person, place, and time.   Psychiatric:         Mood and Affect: Mood normal.         Behavior: Behavior normal.       Assessment/Plan      Missed menses: + UPT in office. LNMP 1/17/25 = 6.3 week EGA with an EDC=10/23/25. Oriented to practice. US today shows a single, viable IUP @ 5.6 weeks. Unable to measure FHR d/t gestational age. (R) ovay wnl. (L) ovary contains a " complex area measuring 2.78 x 1.8cm. Free fluid adjacent to (L) ovary measuring 2.25 x 1.73cm.     Pregnancy: Disc importance of regular prenatal care. Enc PNV daily. Counseled on providers and on call phone. Disc Tylenol products are ok and encouraged no ibuprofen or ASA in pregnancy.  Disc exercise in pregnancy, diet, expected weight gain, etc. Enc no use of tobacco, vaping, drugs, or alcohol during pregnancy. Rev warn s/s of SAB.     Labs: Pt counseled on genetic screening, Quad screen, AFP, and NIPS.     Smoker- non smoker     6.   COVID19  S/P vaccine x 2, no booster    7.  Flu vaccine declined     8.   H/O Miscarriage and RPL-  S/P MFM consult. Declines APLAS labs. Has started baby ASA.  Declines progesterone use.     9.  H/O preeclampsia- Check CBC, CMP and P/C ratio with new OB labs    10.  Nausea- Has Zofran for PRN use. Safe med list provided.     11.  H/O panic attack x 1- No current meds. No counselor.     All questions answered.       Encounter Diagnoses   Name Primary?    Missed menses Yes       Diagnoses and all orders for this visit:    Missed menses  -     POC Urinalysis Dipstick  -     POC Pregnancy, Urine    Other orders  -     aspirin 81 MG chewable tablet; Chew 1 tablet Daily.        RTO in 2 weeks for new OB exam, US- viability  and labs      GISSEL Clark  3/3/2025  15:50 EST

## 2025-03-10 PROBLEM — O14.90 PRE-ECLAMPSIA, ANTEPARTUM: Status: ACTIVE | Noted: 2025-03-10

## 2025-03-10 PROBLEM — Z32.01 POSITIVE URINE PREGNANCY TEST: Status: ACTIVE | Noted: 2025-03-10

## 2025-03-10 PROBLEM — N92.6 MISSED MENSES: Status: ACTIVE | Noted: 2025-03-10

## 2025-03-10 PROBLEM — N96 RECURRENT PREGNANCY LOSS: Status: ACTIVE | Noted: 2025-03-10

## 2025-03-19 ENCOUNTER — INITIAL PRENATAL (OUTPATIENT)
Dept: OBSTETRICS AND GYNECOLOGY | Facility: CLINIC | Age: 35
End: 2025-03-19
Payer: COMMERCIAL

## 2025-03-19 VITALS — WEIGHT: 120.8 LBS | SYSTOLIC BLOOD PRESSURE: 110 MMHG | DIASTOLIC BLOOD PRESSURE: 60 MMHG | BODY MASS INDEX: 17.83 KG/M2

## 2025-03-19 DIAGNOSIS — Z01.419 CERVICAL SMEAR, AS PART OF ROUTINE GYNECOLOGICAL EXAMINATION: ICD-10-CM

## 2025-03-19 DIAGNOSIS — Z36.9 ENCOUNTER FOR ANTENATAL SCREENING, UNSPECIFIED: ICD-10-CM

## 2025-03-19 DIAGNOSIS — O20.8 SUBCHORIONIC HEMORRHAGE IN FIRST TRIMESTER: ICD-10-CM

## 2025-03-19 DIAGNOSIS — Z34.91 INITIAL OBSTETRIC VISIT IN FIRST TRIMESTER: Primary | ICD-10-CM

## 2025-03-19 DIAGNOSIS — O03.9 EARLY PREGNANCY LOSS: ICD-10-CM

## 2025-03-19 DIAGNOSIS — O14.90 PRE-ECLAMPSIA, ANTEPARTUM: ICD-10-CM

## 2025-03-19 LAB
BILIRUB BLD-MCNC: NEGATIVE MG/DL
CLARITY, POC: CLEAR
COLOR UR: YELLOW
GLUCOSE UR STRIP-MCNC: NEGATIVE MG/DL
KETONES UR QL: NEGATIVE
LEUKOCYTE EST, POC: NEGATIVE
NITRITE UR-MCNC: NEGATIVE MG/ML
PH UR: 6 [PH] (ref 5–8)
PROT UR STRIP-MCNC: ABNORMAL MG/DL
RBC # UR STRIP: NEGATIVE /UL
SP GR UR: 1.01 (ref 1–1.03)
UROBILINOGEN UR QL: ABNORMAL

## 2025-03-19 PROCEDURE — 0501F PRENATAL FLOW SHEET: CPT | Performed by: NURSE PRACTITIONER

## 2025-03-19 NOTE — PROGRESS NOTES
Initial ob visit     Chief Complaint   Patient presents with    Initial Prenatal Visit       Stephanie Durham is being seen today for her first obstetrical visit.  She is a 34 y.o.    8w5d gestation. This problem is new to me: yes      OB History          7    Para   3    Term   3            AB   3    Living   3         SAB   3    IAB        Ectopic        Molar        Multiple        Live Births   3                LNMP: 25  Confident with date: Yes  Taking prenatal vitamins: Yes. Needs RX: No  Planned pregnancy: Yes  Prior obstetric issues, potential pregnancy concerns: miscarriage and preeclampsia  Family history of genetic issues (includes FOB): denies   Varicella Hx: disease   Flu vaccine: did not have  COVID 19 vaccine: S/P vaccine. Booster vaccine: declined   History of STDs: denies   Current medications: PNV and baby ASA  Last pap smear: 10/23  Smoker: No  Drug or alcohol abuse: No  H/O physical, emotional or sexual abuse:denies  H/O mental health disorder: panic attack x 1   Prior testing for Cystic Fibrosis Carrier or Sickle Cell Trait- has not had    Prepregnancy BMI: Body mass index is 17.83 kg/m².      Past Medical History:   Diagnosis Date    Anemia     Anxiety     panic attacks    Breast lump     on left    Female infertility 2020    Gestational hypertension 2017    Headache     History of medical problems     Hypertension     preeclapmsia with pregnancy    Migraine 2008    Ovarian cyst 2015    Preeclampsia 2017    Recurrent pregnancy loss, antepartum condition or complication 2016    3 miscarriages    Urinary tract infection     Visual impairment     corneal abrasions       Past Surgical History:   Procedure Laterality Date    BREAST SURGERY Left 2017    Left Breast Biopsy    WISDOM TOOTH EXTRACTION           Current Outpatient Medications:     aspirin 81 MG chewable tablet, Chew 1 tablet Daily., Disp: , Rfl:     prenatal vitamin (prenatal, CLASSIC, vitamin) tablet, Take   by mouth Daily., Disp: , Rfl:     multivitamin with minerals tablet tablet, Take 1 tablet by mouth Daily. (Patient not taking: Reported on 3/19/2025), Disp: , Rfl:     Allergies   Allergen Reactions    Adhesive Tape Hives     bandaid brand       Social History     Socioeconomic History    Marital status:    Tobacco Use    Smoking status: Never     Passive exposure: Never    Smokeless tobacco: Never   Vaping Use    Vaping status: Never Used   Substance and Sexual Activity    Alcohol use: Never    Drug use: Never    Sexual activity: Yes     Partners: Male     Birth control/protection: None       Family History   Problem Relation Age of Onset    Mental illness Mother     Arthritis Mother     Anxiety disorder Mother     Cancer Father     Melanoma Father     Learning disabilities Brother     Thyroid disease Maternal Grandmother     Breast cancer Maternal Grandmother 82    Lung cancer Maternal Grandmother        Review of systems     All other systems reviewed and are negative except for: Gastrointestinal: positive for nausea  Integument/breast: positive for breast tenderness  Musculoskeletal: positive for joint pain      Objective    /60   Wt 54.8 kg (120 lb 12.8 oz)   LMP 01/17/2025   BMI 17.83 kg/m²       General Appearance:    Alert, cooperative, in no acute distress, habitus underweight    Head:    Not examined   Eyes:           Not examined   Ears:  Not examined       Neck:  No thyroid enlargement or nodules present   Back:     No kyphosis present, no scoliosis present,                       Lungs:     Clear to auscultation,respirations regular, even and                   unlabored    Heart:    Regular rhythm and normal rate, normal S1 and S2, no            murmur, no gallop, no rub, no click   Breast Exam:   Fibrocystic breast    Abdomen:     Normal bowel sounds, no masses, no organomegaly, soft        non-tender, non-distended, no guarding, no rebound                 tenderness   Genitalia:     Vulva - No masses, no atrophy, no lesions    Vagina - No discharge, No bleeding    Cervix - No Lesions, closed. Pap collected:Yes     Uterus - Consistent with 8 weeks.     Adnexa - No masses, non tender       Extremities:   Moves all extremities well, no edema, no cyanosis, no              redness       Skin:   No bleeding, bruising or rash       Neurologic:   Sensation intact, A&O times 3      Assessment/Plan    1) Pregnancy at 8w5d- US today shows a single viable IUP @ 8.5 weeks.  bpm. KINZA measuring 0.85 x 0.57cm. Both ovaries wnl.   EDC established 10/24/25 and confirmed by US and LNMP.     2) OB exam: OB exam completed: Yes. New OB bag provided Yes. Pap collected: Yes.    3) Labs: OB labs collected: Yes Counseled on genetic screening: Yes, she declines CF/SMA/FX. Counseled on Quad screen and AFP: Yes, she is to early for AFP. Counseled on NIPS: Yes, she is to early for NIPS.      4) Body mass index is 17.83 kg/m². Women who are underweight with a BMI < 18.5 should gain between 28-40 pounds during the entire pregnancy.     5)  Prenatal care: Oriented to the office and prenatal care. Encourage prenatal vitamins. Disc Tylenol products are fine, avoid aspirin and ibuprofen; Zika (travel restrictions/ok to use insect repellant); not to change cat litter; food restrictions; exercise;  avoidance of alcohol, tobacco, drugs and saunas/hot tubs.     6) COVID19 S/P vaccine x 2, no booster     7) Flu vaccine declined     8) H/O Miscarriage and RPL-  S/P MFM consult. Declines APLAS labs. Has started baby ASA.  Declines progesterone use.      9.  H/O preeclampsia- Check CBC, CMP and P/C ratio with new OB labs     10. Nausea- Has Zofran for PRN use. Safe med list provided.      11.  H/O panic attack x 1- No current meds. No counselor.     12 . KINZA - Rh +. No VB. Instructed on pelvic rest.     13.  Joint pain- mainly her hands and elbows. Extensive family h/o RA.     14.  Breast tenderness- H/O breast pain and multiple  breast masses. Followed by PETE Dudley APRN. The breast pain she is experiencing is significantly worse than before. Will get through first trimester, if breast pain remains worse, will get new imaging of her breast. Last breast US 11/24    All questions answered.     RTO 4 weeks for OB tummy, labs- NIPS and US- KINZA    Lorena Templeton, APRN  3/19/2025

## 2025-03-20 LAB
ABO GROUP BLD: NORMAL
ALBUMIN SERPL-MCNC: 4.4 G/DL (ref 3.9–4.9)
ALP SERPL-CCNC: 42 IU/L (ref 44–121)
ALT SERPL-CCNC: 13 IU/L (ref 0–32)
AST SERPL-CCNC: 13 IU/L (ref 0–40)
BASOPHILS # BLD AUTO: 0.1 X10E3/UL (ref 0–0.2)
BASOPHILS NFR BLD AUTO: 1 %
BILIRUB SERPL-MCNC: 0.4 MG/DL (ref 0–1.2)
BLD GP AB SCN SERPL QL: NEGATIVE
BUN SERPL-MCNC: 8 MG/DL (ref 6–20)
BUN/CREAT SERPL: 12 (ref 9–23)
CALCIUM SERPL-MCNC: 9.2 MG/DL (ref 8.7–10.2)
CHLORIDE SERPL-SCNC: 100 MMOL/L (ref 96–106)
CO2 SERPL-SCNC: 21 MMOL/L (ref 20–29)
CREAT SERPL-MCNC: 0.65 MG/DL (ref 0.57–1)
EGFRCR SERPLBLD CKD-EPI 2021: 119 ML/MIN/1.73
EOSINOPHIL # BLD AUTO: 0.2 X10E3/UL (ref 0–0.4)
EOSINOPHIL NFR BLD AUTO: 2 %
ERYTHROCYTE [DISTWIDTH] IN BLOOD BY AUTOMATED COUNT: 11.7 % (ref 11.7–15.4)
GLOBULIN SER CALC-MCNC: 2.5 G/DL (ref 1.5–4.5)
GLUCOSE SERPL-MCNC: 66 MG/DL (ref 70–99)
HBA1C MFR BLD: 4.9 % (ref 4.8–5.6)
HBV SURFACE AG SERPL QL IA: NEGATIVE
HCT VFR BLD AUTO: 40.3 % (ref 34–46.6)
HCV IGG SERPL QL IA: NON REACTIVE
HGB A MFR BLD ELPH: 97.7 % (ref 96.4–98.8)
HGB A2 MFR BLD ELPH: 2.3 % (ref 1.8–3.2)
HGB BLD-MCNC: 13.5 G/DL (ref 11.1–15.9)
HGB F MFR BLD ELPH: 0 % (ref 0–2)
HGB FRACT BLD-IMP: NORMAL
HGB S MFR BLD ELPH: 0 %
HIV 1+2 AB+HIV1 P24 AG SERPL QL IA: NON REACTIVE
IMM GRANULOCYTES # BLD AUTO: 0.1 X10E3/UL (ref 0–0.1)
IMM GRANULOCYTES NFR BLD AUTO: 1 %
LYMPHOCYTES # BLD AUTO: 1.8 X10E3/UL (ref 0.7–3.1)
LYMPHOCYTES NFR BLD AUTO: 16 %
MCH RBC QN AUTO: 33.2 PG (ref 26.6–33)
MCHC RBC AUTO-ENTMCNC: 33.5 G/DL (ref 31.5–35.7)
MCV RBC AUTO: 99 FL (ref 79–97)
MONOCYTES # BLD AUTO: 0.6 X10E3/UL (ref 0.1–0.9)
MONOCYTES NFR BLD AUTO: 5 %
NEUTROPHILS # BLD AUTO: 8.2 X10E3/UL (ref 1.4–7)
NEUTROPHILS NFR BLD AUTO: 75 %
PLATELET # BLD AUTO: 307 X10E3/UL (ref 150–450)
POTASSIUM SERPL-SCNC: 4 MMOL/L (ref 3.5–5.2)
PROT SERPL-MCNC: 6.9 G/DL (ref 6–8.5)
RBC # BLD AUTO: 4.07 X10E6/UL (ref 3.77–5.28)
RH BLD: POSITIVE
RPR SER QL: NON REACTIVE
RUBV IGG SERPL IA-ACNC: 5.12 INDEX
SODIUM SERPL-SCNC: 136 MMOL/L (ref 134–144)
VZV IGG SER QL IA: REACTIVE
WBC # BLD AUTO: 10.9 X10E3/UL (ref 3.4–10.8)

## 2025-03-21 LAB
AMPHETAMINES UR QL SCN: NEGATIVE NG/ML
BACTERIA UR CULT: NORMAL
BACTERIA UR CULT: NORMAL
BARBITURATES UR QL SCN: NEGATIVE NG/ML
BENZODIAZ UR QL SCN: NEGATIVE NG/ML
BUPRENORPHINE UR QL: NEGATIVE NG/ML
BZE UR QL SCN: NEGATIVE NG/ML
CANNABINOIDS UR QL SCN: NEGATIVE NG/ML
CREAT UR-MCNC: 146.7 MG/DL
CREAT UR-MCNC: 146.9 MG/DL (ref 20–300)
FENTANYL UR-MCNC: NEGATIVE PG/ML
LABORATORY COMMENT REPORT: NORMAL
MEPERIDINE UR QL: NEGATIVE NG/ML
METHADONE UR QL SCN: NEGATIVE NG/ML
OPIATES UR QL SCN: NEGATIVE NG/ML
OXYCODONE+OXYMORPHONE UR QL SCN: NEGATIVE NG/ML
PCP UR QL: NEGATIVE NG/ML
PH UR: 6.9 [PH] (ref 4.5–8.9)
PROPOXYPH UR QL SCN: NEGATIVE NG/ML
PROT UR-MCNC: 9.6 MG/DL
PROT/CREAT UR: 65.4 MG/G CREA (ref 0–200)
TRAMADOL UR QL SCN: NEGATIVE NG/ML

## 2025-03-22 LAB
C TRACH RRNA CVX QL NAA+PROBE: NEGATIVE
CYTOLOGIST CVX/VAG CYTO: NORMAL
CYTOLOGY CVX/VAG DOC CYTO: NORMAL
CYTOLOGY CVX/VAG DOC THIN PREP: NORMAL
DX ICD CODE: NORMAL
HPV GENOTYPE REFLEX: NORMAL
HPV I/H RISK 4 DNA CVX QL PROBE+SIG AMP: NEGATIVE
N GONORRHOEA RRNA CVX QL NAA+PROBE: NEGATIVE
OTHER STN SPEC: NORMAL
SERVICE CMNT-IMP: NORMAL
STAT OF ADQ CVX/VAG CYTO-IMP: NORMAL
T VAGINALIS RRNA SPEC QL NAA+PROBE: NEGATIVE

## 2025-04-14 ENCOUNTER — TELEPHONE (OUTPATIENT)
Dept: OBSTETRICS AND GYNECOLOGY | Facility: CLINIC | Age: 35
End: 2025-04-14

## 2025-04-14 ENCOUNTER — TELEPHONE (OUTPATIENT)
Dept: OBSTETRICS AND GYNECOLOGY | Age: 35
End: 2025-04-14

## 2025-04-14 NOTE — TELEPHONE ENCOUNTER
Caller: Stephanie Durham    Relationship: Self    Best call back number: [phone] (home)       Who is your current provider: [facility]SEES DIFF PROVIDERS THERE, NEXT APPT IS WITH DR RAMOS)    Is your current provider offboarding? NO    Who would you like your new provider to be: DR NICHOLAS- OR ANY PROVIDER IF SHE IS NOT AVAILABLE.    What are your reasons for transferring care: PT STATES AT HER CURRENT OFFICE SHE HAS TO SEE ALL OF THE PROVIDERS AND SHE IS WANTING TO SEE ONE PROVIDER ONLY FOR THE PREGNANCY. SHE STATES SHE UNDERSTANDS IF THE PROVIDER IS OUT OR DOESN'T HAVE AVAILABILITY SHE MAY HAVE TO SEE SOMEONE ELSE. SHE ALSO STATED SHE WANTS TO TO SWITCH DUE TO WANTING TO DELIVER IN [place]. PT HAS HER NEXT APPT THIS WEDNESDAY WITH DR RAMOS AND WAS HOPING TO KNOW IF SHE COULD TRANSFER HER CARE BEFORE THEN.

## 2025-04-14 NOTE — TELEPHONE ENCOUNTER
Caller: Stephanie Durham    Relationship: Self    Best call back number: [phone] (home)       Who is your current provider: SHE HAS SEEN [facility] FOR HER INITIAL OB VISIT. HER NEXT VISIT 04/16 IS WITH DR RAMOS.     Is your current provider offboarding? NO    Who would you like your new provider to be: DR [facility]ICHOLAS [facility]    What are your reasons for transferring care: PT WANTS TO DELIVER IN [place]. SHE ALSO STATED SHE WAS TOLD SHE WOULD HAVE TO SEE ALL OF THE PROVIDERS DURING HER PREGNANCY. SHE IS WANTING TO SEE ONE PROVIDER ONLY DURING THE PREGNANCY UNLESS FOR SOME REASON THAT PROVIDER IS OUT.    [facility].

## 2025-04-16 ENCOUNTER — INITIAL PRENATAL (OUTPATIENT)
Dept: OBSTETRICS AND GYNECOLOGY | Age: 35
End: 2025-04-16
Payer: COMMERCIAL

## 2025-04-16 VITALS — SYSTOLIC BLOOD PRESSURE: 124 MMHG | WEIGHT: 122.8 LBS | DIASTOLIC BLOOD PRESSURE: 84 MMHG | BODY MASS INDEX: 18.12 KG/M2

## 2025-04-16 DIAGNOSIS — N96 RECURRENT PREGNANCY LOSS: ICD-10-CM

## 2025-04-16 DIAGNOSIS — Z3A.12 12 WEEKS GESTATION OF PREGNANCY: ICD-10-CM

## 2025-04-16 DIAGNOSIS — N64.4 BREAST PAIN, LEFT: ICD-10-CM

## 2025-04-16 DIAGNOSIS — O14.90 PRE-ECLAMPSIA, ANTEPARTUM: ICD-10-CM

## 2025-04-16 DIAGNOSIS — O20.8 SUBCHORIONIC HEMORRHAGE IN FIRST TRIMESTER: Primary | ICD-10-CM

## 2025-04-16 DIAGNOSIS — Z80.8 FAMILY HISTORY OF MELANOMA: ICD-10-CM

## 2025-04-16 DIAGNOSIS — N63.32 MASS OF AXILLARY TAIL OF LEFT BREAST: ICD-10-CM

## 2025-04-16 DIAGNOSIS — Z13.89 SCREENING FOR HEMATURIA OR PROTEINURIA: ICD-10-CM

## 2025-04-16 DIAGNOSIS — Z34.81 ENCOUNTER FOR SUPERVISION OF OTHER NORMAL PREGNANCY, FIRST TRIMESTER: ICD-10-CM

## 2025-04-16 PROBLEM — Z31.69 INFERTILITY COUNSELING: Status: RESOLVED | Noted: 2023-10-09 | Resolved: 2025-04-16

## 2025-04-16 PROBLEM — N92.6 MISSED MENSES: Status: RESOLVED | Noted: 2025-03-10 | Resolved: 2025-04-16

## 2025-04-16 PROBLEM — Z01.419 ROUTINE GYNECOLOGICAL EXAMINATION: Status: RESOLVED | Noted: 2023-10-09 | Resolved: 2025-04-16

## 2025-04-16 PROBLEM — Z34.90 TERM PREGNANCY: Status: RESOLVED | Noted: 2019-07-10 | Resolved: 2025-04-16

## 2025-04-16 PROBLEM — Z01.419 CERVICAL SMEAR, AS PART OF ROUTINE GYNECOLOGICAL EXAMINATION: Status: RESOLVED | Noted: 2023-10-09 | Resolved: 2025-04-16

## 2025-04-16 PROBLEM — Z32.01 POSITIVE URINE PREGNANCY TEST: Status: RESOLVED | Noted: 2025-03-10 | Resolved: 2025-04-16

## 2025-04-16 PROBLEM — Z34.91 INITIAL OBSTETRIC VISIT IN FIRST TRIMESTER: Status: RESOLVED | Noted: 2025-03-19 | Resolved: 2025-04-16

## 2025-04-16 LAB
CLARITY, POC: CLEAR
COLOR UR: YELLOW
GLUCOSE UR STRIP-MCNC: NEGATIVE MG/DL
NTRA CYSTIC FIBROSIS: NORMAL
NTRA SPINAL MUSCULAR ATROPHY: NORMAL
PROT UR STRIP-MCNC: NEGATIVE MG/DL

## 2025-04-16 NOTE — PROGRESS NOTES
Chief Complaint   Patient presents with    Initial Prenatal Visit     NEW OB, Transfer Of Care. LMP - 01/17/2025  Patient has history of cysts in breasts, would like to go back and see her breast specialist. She also has a subchorionic hemorrhage.         HPI  Stephanie Durham is a 34 y.o. female presents as a new pt to this MD. She is pregnant and is transferring care to deliver at [facility]. She denies vag bleeding or pain. She is having some n/v. She notes emesis is rare with mild nausea throughout the day.     She complains of swelling in her left axilla. She notes chronic issues with fibrocystic breast changes on the left. She has been followed by Siva Dudley with last visit 11/2024. She now notes more swelling and tenderness in her left axilla since pregnancy.         The following portions of the patient's history were reviewed and updated as appropriate: allergies, current medications, past family history, past medical history, past social history, past surgical history, and problem list.    Review of Systems  Pertinent items are noted in HPI.    /84   Wt 55.7 kg (122 lb 12.8 oz)   LMP 01/17/2025   BMI 18.12 kg/m²         Physical Exam  Constitutional:       Appearance: Normal appearance.   Neurological:      General: No focal deficit present.      Mental Status: She is alert and oriented to person, place, and time.   Psychiatric:         Mood and Affect: Mood normal.         Behavior: Behavior normal.     FH c/w 12 weeks  FHT's 154 bpm  Ext: no edema or cords    Breasts: negative inspection. Examined in sitting and supine positions. Right breast with fibrocystic changes throughout. No masses, retractions, nipple discharge or axillary adenopathy. Left breast with fibrocystic changes throughout. 1 cm tender nodule noted at 1:00 in axillary tail. No retractions, nipple discharge or axillary adenopathy noted.         Diagnoses and all orders for this visit:    1. Subchorionic hemorrhage in first trimester  (Primary)    2. History of preeclampsia  -     Anticardiolipin Antibody, IgG / M, Qn  -     Lupus Anticoagulant Panel    3. 12 weeks gestation of pregnancy  -     POC Urinalysis Dipstick    4. Screening for hematuria or proteinuria  -     POC Urinalysis Dipstick    5. Recurrent pregnancy loss  -     Anticardiolipin Antibody, IgG / M, Qn  -     Lupus Anticoagulant Panel    6. Encounter for supervision of other normal pregnancy, first trimester  -     Rian Rodas Prenatal Test: Chromosomes 13, 18, 21, X & Y: Triploidy 22Q.11.2 Deletion - Blood, Blood, Venous    7. Mass of axillary tail of left breast  -     US Breast Left Limited; Future  -     Ambulatory Referral to Breast Surgery    8. Breast pain, left  -     Ambulatory Referral to Breast Surgery    9. Family history of melanoma  -     Ambulatory Referral to Dermatology      F/u 2 wks for FHT's due to hx and pt agrees.     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 She desires aneuploidy screen with gender.

## 2025-04-22 NOTE — PROGRESS NOTES
"[facility]     Referring Provider: Radha Spears MD     Chief complaint: breast mass and nipple discharge     HPI: Ms. Stephanie Durham is a 33 yo woman, seen at the request of Radha Spears MD, for a new diagnosis of nipple discharge and right breast mass.      She denies any breast lumps, pain or skin changes.  2017 needle biopsy that showed left lactating adenoma which was not removed.      She reports that she is otherwise healthy and does not take any medications.     She has a family history of breast cancer in her Maternal grandmother aged 82.     She was by herself in clinic today.  Today presenting with concerns regarding abnormal imaging and right outer quadrant breast pain since .  The pain comes and goes and gets worse with her period. She has a hx of brady milky discharge for the past 9 years.  Very small amount, makes nipples crusty. Not expressing, not bloody, thinks from one duct        I personally reviewed her records and summarized her relevant breast history/imagin2022 Saw Dr. Jacob  \"Stephanie Durham is a 32 y.o. patient who presents as a new pt with complaints of a right breast mass noted in 2022. Pt reports the mass is persistent but swells during her menstrual cycles. The mass is tender. She has hx of a left lactating adenoma that was biopsied:2017. Maternal GM with breast cancer at age 82. : pt stopped breast feeding 2020. Pt reporting bilateral milky discharge- spontaneous. She states she had a work up in 2018 that was negative. She reports she has not noted any discharge in the last several months. Pt is not on any contraception- she is ok with pregnancy. \"  \"No mass noted of right outer breast were patient's having her complaints.  Costochondritis noted as she is very tender between her ribs in this area.  Patient's palpable concerns are equal on the right and the left side and no dominant masses noted.  With manipulation of the nipple a milky " "discharge was released from the left nipple from 1 duct\"    9/21/2022   TSH 1.050  Prolactin 6.9    10/20/2022 diagnostic bilateral mammogram and bilateral limited ultrasound at [facility]  FINDINGS:  There are scattered areas of fibroglandular density.   . In the left upper outer quadrant there is a 10.5 x 7.3 mm oval nodule  and  in the axilla high on image there is a biopsy marker clip  associated with a 11 mm nodule.  Targeted ultrasound of the left breast was performed as well as both  retroareolar regions. No retroareolar abnormalities are identified on  either side. 1 cm from the nipple on the left side, there may be a 7 mm  3 mm x 3 mm well-circumscribed nodule but this could also represent a  fat lobule. This Is uniform and nonshadowing. Another similar lesion is  noted at 1:00, 2 cm from nipple measuring 8 x 3 x 7 mm. It is also very  similar to the adjacent fat lobules.  In the axillary tail region there is a possible lesion identified  measuring 13 x 4 x 14 mm. This also is very similar to the adjacent fat  lobule  IMPRESSION:  Right breast single view mammogram and retroareolar  ultrasound was negative  Left breast single view mammogram shows 2 nodular densities. One is in  the far left upper-outer quadrant and the other in the axilla. The  axillary abnormality has a biopsy clip associated with it. No suspicious  ultrasound findings are identified in the left breast at the sites.  Possible small fibroadenomas versus fat lobules are identified 1:00 near  the nipple and in the left upper-outer quadrant.  Recommend 6 month follow-up left MLO view and repeat left breast  ultrasound  BI-RADS CATEGORY 3    2/17/2023   Patient presented to the office today for routine follow-up.  She has been using vitamin E daily which has helped significantly with her breast pain.  She is still experiencing breast pain but only around her period.  Also she denies any more nipple discharge since before being seen " the last time that she was here.  She already has imaging set up from May to follow-up on a BI-RADS 3.  She has no new breast concerns today.    11/16/2023   Patient presenting to the office today for routine follow-up.  She has no issues with breast pain anymore her nipple discharge has completely stopped unless she manipulates the left nipple and it is a white discharge.  She does have concerns regarding some swollen tender lymph nodes under her right arm last month they seem to be under her left arm they swell up or tender and then they go away within a week.    6/6/2024   Patient presenting to the office today for routine follow-up.  Her breast pain has still completely resolved.  She is no longer having any nipple discharge.  She is due for left limited ultrasound.  She has no new breast complaints or concerns today.    11/21/2024   Patient presenting to the office today for routine follow-up.  On 11/14/2024 she had a left limited breast ultrasound that resulted as BI-RADS 2 taking her out of surveillance.  Patient has no other breast complaints or concerns today.    4/24/2025 interval history  Presenting to the office today with a new issue of left axillary swelling with pregnancy for 4 weeks.  Wearing a sports bra which is helping. Painful to touch.  This happened with last 2 pregnancies.     Review of Systems   All other systems reviewed and are negative.      Medications:    Current Outpatient Medications:     aspirin 81 MG chewable tablet, Chew 1 tablet Daily., Disp: , Rfl:     prenatal vitamin (prenatal, CLASSIC, vitamin) tablet, Take  by mouth Daily., Disp: , Rfl:     Allergies:  Allergies   Allergen Reactions    Adhesive Tape Hives     bandaid brand       Medical history:  Past Medical History:   Diagnosis Date    Anemia     Anxiety     panic attacks    Breast lump     on left    Female infertility 2020    Gestational hypertension 2017    Headache     History of medical problems     Hypertension      preeclapmsia with pregnancy    Migraine 2008    Ovarian cyst 2015    Preeclampsia 2017    Recurrent pregnancy loss, antepartum condition or complication 2016    3 miscarriages    Urinary tract infection     Visual impairment     corneal abrasions       Surgical History:  Past Surgical History:   Procedure Laterality Date    BREAST SURGERY Left 2017    Left Breast Biopsy    WISDOM TOOTH EXTRACTION  2008       Family History:  Family History   Problem Relation Age of Onset    Mental illness Mother     Arthritis Mother     Anxiety disorder Mother     Cancer Father     Melanoma Father     Learning disabilities Brother     Thyroid disease Maternal Grandmother     Breast cancer Maternal Grandmother 82    Lung cancer Maternal Grandmother        Social History:   Social History     Socioeconomic History    Marital status:    Tobacco Use    Smoking status: Never     Passive exposure: Never    Smokeless tobacco: Never   Vaping Use    Vaping status: Never Used   Substance and Sexual Activity    Alcohol use: Never    Drug use: Never    Sexual activity: Not Currently     Partners: Male     Birth control/protection: None     Patient drink 44 ounces of caffeine a day.        GYNECOLOGIC HISTORY:   . P: 3. AB: 2.  Last menstrual period: 10/27/2022  Age at menarche: 14  Age at first childbirth: 23  Lactation/How lon months  Age at menopause: premenopausal  Total years of oral contraceptive use: 2 years  Total years of hormone replacement therapy: 0      Physical Exam  There were no vitals filed for this visit.      ECOG 0 - Asymptomatic  General: NAD, well appearing  Psych: a&o x 3, normal mood and affect  Eyes: EOMI, no scleral icterus  ENMT: neck supple without masses or thyromegaly, mucus membranes moist  Resp: normal effort, CTAB  CV: RRR, no murmurs, no edema   GI: soft, NT, ND  MSK: normal gait, normal ROM in bilateral shoulders  Lymph nodes:  no cervical, supraclavicular or axillary lymphadenopathy  Breast:  symmetric, small  Right:  No visible abnormalities on inspection while seated, with arms raised or hands on hips. No masses, skin changes, or nipple abnormalities. No discharge unable to palpate any lymph nodes in axilla  Left:  No visible abnormalities on inspection while seated, with arms raised or hands on hips. No skin changes, or nipple abnormalities. No discharge unable to palpate any lymph nodes in axilla 1:00 0.5 mass, 1:00 2cmfn 0.5cm mass, 1:00 8cmfn 1cm mass    Imagin2023 left diagnostic mammogram and left limited breast ultrasound at [facility]  FINDINGS: Unilateral left digital CC mammographic and MLO mammographic  and Tomosynthesis images were obtained. Comparison is made to prior  mammography dated 10/20/2022. The parenchyma of the left breast  demonstrates a heterogeneously dense pattern which lessens the  sensitivity. There is a stable area of focal asymmetry that measures on  the order of 1.3 cm in greatest dimension that is seen in the axillary  tail region of the left breast. No architectural distortion or  suspicious calcifications are visualized. There is no evidence for skin  thickening, nipple retraction or axillary adenopathy.  ULTRASOUND: Targeted sonographic evaluation of the left breast was  performed through the 1-o'clock position in a retroareolar location, the  1-o'clock position on the order of 2 cm from the nipple and the  1-o'clock position on the order of 8 cm from the nipple in the axillary  tail region. Comparison is made to prior breast sonography dated  10/20/2022.  In the retroareolar region at the 1-o'clock position there is a 0.6 x  0.7 x 0.3 cm oval circumscribed hypoechoic mass that previously measured  0.7 x 0.8 x 0.3 cm. No detectable internal vascularity is appreciated.  The imaging features suggest a benign fibroadenoma.  At the 1-o'clock position on the order of 2 cm from the nipple there is  a 0.9 x 0.8 x 0.3 cm oval circumscribed hypoechoic mass that  previously  measured 0.8 x 0.7 x 0.3 cm. The imaging features suggest a benign  fibroadenoma.  In the axillary tail region at the 1-o'clock position on the order of 8  cm from the nipple there is a 1.5 x 1.5 x 0.4 cm oval circumscribed  hypoechoic masslike region that previously measured 1.4 x 1.4 x 0.4 cm.  The imaging features are most consistent with a benign fibroadenoma  versus an area of prominent fibrous tissue.  IMPRESSION:  There are 3 stable probable benign lesions in the left breast at the  1-o'clock axis as described. Continue sonographic follow-up of left  breast in 6-9 month intervals is recommended through October 2024 to  demonstrate two-year stability.  BI-RADS Category 3: Probably benign finding. Short-term sonographic  follow-up is recommended.    11/10/2023 left Limited breast ultrasound at [facility]  FINDINGS: Ultrasound evaluation again demonstrates a small ovoid solid  nodule in retroareolar region at 1 o'clock measuring 7 x 3 x 6 mm and  showing no change from the previous exams. There is a similar solid  appearing nodule located at 1 o'clock, 2 cm from the nipple that  measures 6 x 5 x 3 mm compared to a size of 9 x 8 x 3 mm on the previous  exam. There is a somewhat elongated ovoid solid nodule in the left  axillary tail located at 1 o'clock, 8 cm from the nipple that measures  1.5 x 1.5 x 0.4 cm and is unchanged.  CONCLUSION: Benign directed left breast ultrasound showing 3  benign-appearing solid nodules as described above. 1 of these is smaller  since 05/12/2023. The others are unchanged. A followup directed left  breast ultrasound in 1 year might be considered to ensure 2-year  stability. BI-RADS 2. Benign.    11/14/2024 Left limited breast US  Targeted sonographic evaluation of the left breast was performed for  follow-up. At 1:00 retroareolar, there is a 0.6 x 0.3 x 0.7 cm mass,  which is similar dating back to 10/20/2022, previously 0.8 x 0.2 x 0.7  cm at that time. At 1:00, 2 cm from the  nipple, there is a 0.6 x 0.3 x  0.5 cm mass, which is smaller from 2022, previously 0.8 x 0.3 x 0.7 cm  at that time. At 1:00, 8 cm from the nipple in the axillary tail, there  is a 1.6 x 0.4 x 1.6 cm mass versus prominent fat lobule, which is  similar, previously 1.4 x 0.4 x 1.4 cm. These can be considered benign.  Recommend annual screening mammogram beginning at age 40 or sooner as  direct by clinical risk assessment.  BI-RADS Category 2: Benign    Assessment:  34 y.o. F with a new diagnosis of breast pain-  resolved  2.nipple discharge- resolved  3. Abnormal imaging- resolved   4. Family history of breast cancer    Discussion:  1. We discussed types of nipple discharge. We discussed that physiologic discharge is usually bilateral, nonspontaneous, multi-ductal, and milky, but can be yellow, green or brown. That this can sometimes be associated with elevated prolactin levels and that elevated prolactin levels can have multiple causes (pregnancy, pituitary, thyroid, medications, etc).    We discussed that pathologic nipple discharge usually is unilateral, spontaneous, persistent, comes from a single duct, is bloody, clear, or serosanguinous, or is associated with a palpable or radiological evident breast mass.   I believe this is physiologic and no MRI work-up is needed at this time.  2. I explained the concept of a BI-RADS 3 designation and the process of imaging surveillance. We discussed that a BI-RADS 3 designation describes an imaging finding that is 98-99% likely to represent a benign process. We discussed that the most common management of a BI-RADS 3 finding is initial 6 month imaging surveillance and that the entire period of imaging surveillance can often take 2 years.   3. We had a lengthy discussion about breast pain and how it can sometimes be difficult to treat. We discussed that this is often related to hormonal changes. Caffeine and nicotine can also play a role in breast pain. We also discussed the  importance of wearing a good supportive bra at all times including bedtime.  We discussed additional therapies such as vitamin E supplementation and Primrose oil, and that these may or may not be useful. We discussed using OTC tylenol or ibuprofen for pain control. Finally, we discussed the use of topical NSAID creams.    4. Lifetime risk per TC model is 18.1%    Plan:  1.  Left limited us in the near future, call with results  2. Rto in 3 mons for exam    GISSEL Lee    I have spent 20 min in face to face time with the patient and in chart review    CC:  MD Pita Ryan, MD Pedro

## 2025-04-24 ENCOUNTER — OFFICE VISIT (OUTPATIENT)
Dept: SURGERY | Facility: CLINIC | Age: 35
End: 2025-04-24
Payer: COMMERCIAL

## 2025-04-24 VITALS
WEIGHT: 122 LBS | BODY MASS INDEX: 18.07 KG/M2 | DIASTOLIC BLOOD PRESSURE: 78 MMHG | OXYGEN SATURATION: 100 % | HEIGHT: 69 IN | SYSTOLIC BLOOD PRESSURE: 118 MMHG | HEART RATE: 81 BPM

## 2025-04-24 DIAGNOSIS — N64.4 BREAST PAIN: Primary | ICD-10-CM

## 2025-04-24 PROCEDURE — 99213 OFFICE O/P EST LOW 20 MIN: CPT | Performed by: NURSE PRACTITIONER

## 2025-05-01 ENCOUNTER — ROUTINE PRENATAL (OUTPATIENT)
Dept: OBSTETRICS AND GYNECOLOGY | Age: 35
End: 2025-05-01
Payer: COMMERCIAL

## 2025-05-01 ENCOUNTER — LAB (OUTPATIENT)
Facility: HOSPITAL | Age: 35
End: 2025-05-01
Payer: COMMERCIAL

## 2025-05-01 VITALS — DIASTOLIC BLOOD PRESSURE: 70 MMHG | SYSTOLIC BLOOD PRESSURE: 106 MMHG | BODY MASS INDEX: 18.6 KG/M2 | WEIGHT: 126 LBS

## 2025-05-01 DIAGNOSIS — Z3A.14 14 WEEKS GESTATION OF PREGNANCY: Primary | ICD-10-CM

## 2025-05-01 DIAGNOSIS — M25.559 PREGNANCY RELATED HIP PAIN, ANTEPARTUM: ICD-10-CM

## 2025-05-01 DIAGNOSIS — O20.8 SUBCHORIONIC HEMORRHAGE IN FIRST TRIMESTER: ICD-10-CM

## 2025-05-01 DIAGNOSIS — O26.899 PREGNANCY RELATED HIP PAIN, ANTEPARTUM: ICD-10-CM

## 2025-05-01 DIAGNOSIS — Z13.89 SCREENING FOR HEMATURIA OR PROTEINURIA: ICD-10-CM

## 2025-05-01 DIAGNOSIS — N96 RECURRENT PREGNANCY LOSS: ICD-10-CM

## 2025-05-01 LAB
APTT HEX PL PPP: 7 SEC
APTT IMM NP PPP: ABNORMAL SEC
APTT PPP 1:1 SALINE: ABNORMAL SEC
APTT PPP: 25.2 SEC
B2 GLYCOPROT1 IGA SER-ACNC: <10 SAU
B2 GLYCOPROT1 IGG SER-ACNC: <10 SGU
B2 GLYCOPROT1 IGM SER-ACNC: <10 SMU
CARDIOLIPIN IGG SER IA-ACNC: <10 GPL
CARDIOLIPIN IGG SER IA-ACNC: <9 GPL U/ML (ref 0–14)
CARDIOLIPIN IGM SER IA-ACNC: 10 MPL U/ML (ref 0–12)
CARDIOLIPIN IGM SER IA-ACNC: 21 MPL
CLARITY, POC: CLEAR
COLOR UR: YELLOW
CONFIRM APTT: 0.9 SEC
CONFIRM DRVVT: ABNORMAL SEC
DRVVT SCREEN TO CONFIRM RATIO: ABNORMAL RATIO
EXTERNAL NIPT: NORMAL
GLUCOSE UR STRIP-MCNC: NEGATIVE MG/DL
INR PPP: 1 RATIO
LABORATORY COMMENT REPORT: ABNORMAL
PROT UR STRIP-MCNC: NEGATIVE MG/DL
PROTHROMBIN TIME: 10.7 SEC
SCREEN DRVVT: 37.5 SEC
THROMBIN TIME: 16.4 SEC
TSH SERPL DL<=0.005 MIU/L-ACNC: 0.59 UIU/ML (ref 0.45–4.5)

## 2025-05-01 PROCEDURE — 36415 COLL VENOUS BLD VENIPUNCTURE: CPT | Performed by: OBSTETRICS & GYNECOLOGY

## 2025-05-01 NOTE — PROGRESS NOTES
Chief Complaint   Patient presents with    Routine Prenatal Visit     14 Weeks', 6 Day's. Patient has concerns about hip pain and discomfort.         HPI  Stephanie Durham is a 35 y.o. female presents for f/u visit. She denies vag bleeding but is already having hip pain.     She saw breast surgery NP and has breast u/s scheduled tomorrow.         The following portions of the patient's history were reviewed and updated as appropriate: allergies, current medications, past family history, past medical history, past social history, past surgical history, and problem list.    Review of Systems  Musculoskeletal: pos for hip pain    /70   Wt 57.2 kg (126 lb)   LMP 01/17/2025   BMI 18.60 kg/m²         Physical Exam  Constitutional:       Appearance: Normal appearance.   Neurological:      Mental Status: She is alert.   Psychiatric:         Mood and Affect: Mood normal.         Behavior: Behavior normal.     FH= 15 cm  FHT's 156 bpm  Ext: no edema or cords        Diagnoses and all orders for this visit:    1. 14 weeks gestation of pregnancy (Primary)  -     POC Urinalysis Dipstick    2. Screening for hematuria or proteinuria  -     POC Urinalysis Dipstick    3. Recurrent pregnancy loss  -     Anticardiolipin Antibody, IgG / M, Qn  -     Lupus Anticoagulant Panel    4. Pregnancy related hip pain, antepartum  -     Ambulatory Referral to Physical Therapy for Evaluation & Treatment    5. Subchorionic hemorrhage in first trimester      Recurrent SAB: APS labs did not complete through [facility]. Ordered again with E lab. Pt will go today.    Hx KINZA: u/s at f/u    Hx preeclampsia: on baby asa    F/u 2 wks or prn.

## 2025-05-02 ENCOUNTER — HOSPITAL ENCOUNTER (OUTPATIENT)
Dept: ULTRASOUND IMAGING | Facility: HOSPITAL | Age: 35
Discharge: HOME | End: 2025-05-02
Admitting: NURSE PRACTITIONER
Payer: COMMERCIAL

## 2025-05-02 PROCEDURE — 76882 US LMTD JT/FCL EVL NVASC XTR: CPT

## 2025-05-05 ENCOUNTER — RESULTS FOLLOW-UP (OUTPATIENT)
Dept: SURGERY | Facility: CLINIC | Age: 35
End: 2025-05-05
Payer: COMMERCIAL

## 2025-05-05 NOTE — TELEPHONE ENCOUNTER
Spoke to pt and let her know the following   Please let her know that her ultrasound is normal and keep her appointment with me in July   Pt stated understanding

## 2025-05-05 NOTE — TELEPHONE ENCOUNTER
----- Message from Siva Dudley sent at 5/5/2025  9:28 AM EDT -----  Please let her know that her ultrasound is normal and keep her appointment with me in July  ----- Message -----  From: [facility] [place]  Sent: 5/2/2025  10:56 PM EDT  To: GISSEL Lee

## 2025-05-13 ENCOUNTER — ROUTINE PRENATAL (OUTPATIENT)
Dept: OBSTETRICS AND GYNECOLOGY | Age: 35
End: 2025-05-13
Payer: COMMERCIAL

## 2025-05-13 VITALS — BODY MASS INDEX: 18.75 KG/M2 | WEIGHT: 127 LBS | DIASTOLIC BLOOD PRESSURE: 70 MMHG | SYSTOLIC BLOOD PRESSURE: 104 MMHG

## 2025-05-13 DIAGNOSIS — Z3A.16 16 WEEKS GESTATION OF PREGNANCY: Primary | ICD-10-CM

## 2025-05-13 DIAGNOSIS — Z36.1 ANTENATAL SCREENING FOR RAISED ALPHAFETOPROTEIN LEVEL: ICD-10-CM

## 2025-05-13 DIAGNOSIS — O14.90 PRE-ECLAMPSIA, ANTEPARTUM: ICD-10-CM

## 2025-05-13 DIAGNOSIS — N96 RECURRENT PREGNANCY LOSS: ICD-10-CM

## 2025-05-13 DIAGNOSIS — O20.8 SUBCHORIONIC HEMORRHAGE IN FIRST TRIMESTER: ICD-10-CM

## 2025-05-13 DIAGNOSIS — O44.00 PLACENTA PREVIA ANTEPARTUM: ICD-10-CM

## 2025-05-13 LAB
APTT HEX PL PPP: 8 SEC
APTT IMM NP PPP: ABNORMAL SEC
APTT PPP 1:1 SALINE: ABNORMAL SEC
APTT PPP: 24.2 SEC
B2 GLYCOPROT1 IGA SER-ACNC: <10 SAU
B2 GLYCOPROT1 IGG SER-ACNC: <10 SGU
B2 GLYCOPROT1 IGM SER-ACNC: <10 SMU
CARDIOLIPIN IGG SER IA-ACNC: <10 GPL
CARDIOLIPIN IGM SER IA-ACNC: 22 MPL
CONFIRM APTT: 2.4 SEC
CONFIRM DRVVT: ABNORMAL SEC
DRVVT SCREEN TO CONFIRM RATIO: ABNORMAL RATIO
GLUCOSE UR STRIP-MCNC: NEGATIVE MG/DL
INR PPP: 1 RATIO
LABORATORY COMMENT REPORT: ABNORMAL
PROT UR STRIP-MCNC: NEGATIVE MG/DL
PROTHROMBIN TIME: 10.8 SEC
SCREEN DRVVT: 33.4 SEC
THROMBIN TIME: 17 SEC

## 2025-05-13 NOTE — PROGRESS NOTES
Chief Complaint   Patient presents with    Routine Prenatal Visit     16 weeks  Cc:  wants to discuss previous labs        HPI  Stephanie Durham is a 35 y.o. female presents for OB visit. She denies vag bleeding.        The following portions of the patient's history were reviewed and updated as appropriate: allergies, current medications, past family history, past medical history, past social history, past surgical history, and problem list.    Review of Systems  Pertinent items are noted in HPI.    /70   Wt 57.6 kg (127 lb)   LMP 2025   BMI 18.75 kg/m²         Physical Exam  Constitutional:       Appearance: Normal appearance.   Neurological:      Mental Status: She is alert.   Psychiatric:         Mood and Affect: Mood normal.         Behavior: Behavior normal.       FH= 16 cm  FHT's 148 bpm  Ext: no edema or cords    U/s: viable IUP, cervical length 3.9 cm, anterior placenta previa      Diagnoses and all orders for this visit:    1. 16 weeks gestation of pregnancy (Primary)  -     POC Urinalysis Dipstick  -     Alpha Fetoprotein, Maternal    2.  screening for raised alphafetoprotein level  -     Alpha Fetoprotein, Maternal    3. Recurrent pregnancy loss  -     Ambulatory Referral to [facility]/Perinatology    4. Subchorionic hemorrhage in first trimester    5. History of preeclampsia    6. Placenta previa antepartum      16 wks: check afp, anatomy u/s at f/u    Hx recurrent loss/KINZA: cervical length 3.9; discussed APS labs. Not diagnostic of APS. Pt concerned regarding elevated ACL ab. She saw M prior to pregnancy and is interested in f/u consult for further recommendations.    Previa: reviewed findings but advised may resolve with f/u     Hx preeclampsia: on baby asa

## 2025-05-15 LAB
AFP INTERP SERPL-IMP: NORMAL
AFP INTERP SERPL-IMP: NORMAL
AFP MOM SERPL: 1.24
AFP SERPL-MCNC: 49.6 NG/ML
AGE AT DELIVERY: 34.4 YR
GA METHOD: NORMAL
GA: 16.6 WEEKS
IDDM PATIENT QL: NO
LABORATORY COMMENT REPORT: NORMAL
MULTIPLE PREGNANCY: NO
NEURAL TUBE DEFECT RISK FETUS: 5748 %
RESULT: NORMAL

## 2025-05-19 ENCOUNTER — HOSPITAL ENCOUNTER (OUTPATIENT)
Dept: PHYSICAL THERAPY | Facility: HOSPITAL | Age: 35
Setting detail: THERAPIES SERIES
Discharge: HOME | End: 2025-05-19
Payer: COMMERCIAL

## 2025-05-19 DIAGNOSIS — R29.898 HIP WEAKNESS: ICD-10-CM

## 2025-05-19 DIAGNOSIS — M25.551 BILATERAL HIP PAIN: Primary | ICD-10-CM

## 2025-05-19 DIAGNOSIS — M25.552 BILATERAL HIP PAIN: Primary | ICD-10-CM

## 2025-05-19 DIAGNOSIS — M54.50 ACUTE MIDLINE LOW BACK PAIN WITHOUT SCIATICA: ICD-10-CM

## 2025-05-19 PROCEDURE — 97161 PT EVAL LOW COMPLEX 20 MIN: CPT

## 2025-05-19 PROCEDURE — 97110 THERAPEUTIC EXERCISES: CPT

## 2025-05-19 NOTE — THERAPY EVALUATION
Outpatient Physical Therapy Ortho Initial Evaluation  [facility]     Patient Name: Stephanie Durham  : 1990  MRN: [id number]  Today's Date: 2025      Visit Date: 2025    Patient Active Problem List   Diagnosis    Galactorrhea not associated with childbirth    Nipple discharge    Abnormal finding on breast imaging    Family history of breast cancer    Breast pain    History of preeclampsia    Recurrent pregnancy loss    Subchorionic hemorrhage in first trimester    Breast pain, left    Placenta previa antepartum        Past Medical History:   Diagnosis Date    Anemia     Anxiety     panic attacks    Breast lump     on left    Female infertility 2020    Gestational hypertension 2017    Headache     History of medical problems     Hypertension     preeclapmsia with pregnancy    Migraine     Ovarian cyst 2015    Preeclampsia 2017    Recurrent pregnancy loss, antepartum condition or complication 2016    3 miscarriages    Urinary tract infection     Visual impairment     corneal abrasions        Past Surgical History:   Procedure Laterality Date    BREAST SURGERY Left 2017    Left Breast Biopsy    WISDOM TOOTH EXTRACTION         Visit Dx:     ICD-10-CM ICD-9-CM   1. Bilateral hip pain  M25.551 719.45    M25.552    2. Acute midline low back pain without sciatica  M54.50 724.2   3. Hip weakness  R29.898 729.89          Patient History       Row Name 25 0700             History    Chief Complaint Pain  -MO      Type of Pain Back pain;Hip pain  -MO      Brief Description of Current Complaint 36 y/o female pt, 17wks 3d GA, 7G3P, reporting to PT w/ acute onset of BL hip and low back pain. Happened with last pregnancy, took until 7 months postpartum to resolve. Hips started insidiously, likely due to increased activity however last week she was standing, slipped and tried to catch herself and feels like she pulled her R hip flexor, has had significant pain since. Initial symptoms started on  outside of hips, R>L, and then now shooting pain now on R leg after slip last week. Additionally having more low back pain. No symptoms down leg or any radicular symptoms. Tossing and turning at night, really uncomfortable changing positions. She has low tolerance time to any prolonged positioning, has to change or move every 10-30 mins. Pain is near constant, feels like ache all day but will increase to sharp pains intermittently. Wearing belly band most of the time, wore during all pregnancies. She was so sick at beginning of pregnancy, had to be in bed for several weeks. Found subchronic hemorrhage in 1st trimester, was put on full lifting restrictions. Recent imagining show stability, they did keep restrictions. At her baseline, she is very active outside of pregnancy, tries to life consistently. She works at home, at desk most the day or transitions to her bed 2/2 pain. Her kids are 11,7,5.  -MO      Patient/Caregiver Goals Know what to do to help the symptoms  -MO         Pain     Tolerance Time- Sitting 10  -MO         Fall Risk Assessment    Any falls in the past year: No  -MO         Services    Prior Rehab/Home Health Experiences No  -MO         Daily Activities    Primary Language English  -MO      Are you able to read Yes  -MO      Are you able to write Yes  -MO      How does patient learn best? Listening;Reading;Demonstration  -MO      Does patient have problems with the following? None  -MO      Barriers to learning None  -MO      Pt Participated in POC and Goals Yes  -MO         Safety    Are you being hurt, hit, or frightened by anyone at home or in your life? No  -MO      Are you being neglected by a caregiver No  -MO      Have you had any of the following issues with N/A  -MO                User Key  (r) = Recorded By, (t) = Taken By, (c) = Cosigned By      Initials Name Provider Type    Lou Cervantes, PT Physical Therapist                     PT Ortho       Row Name 05/19/25 0700        Posture/Observations    Alignment Options Lumbar lordosis  -MO    Posture/Observations Comments standing posture: increased lordosis, BL knee locked out  -MO       Quarter Clearing    Quarter Clearing Lower Quarter Clearing  -MO       Neural Tension Signs- Lower Quarter Clearing    SLR Bilateral:;Negative  -MO       Myotomal Screen- Lower Quarter Clearing    Hip flexion (L2) Bilateral:;4- (Good -)  likely limited secondary to pain  -MO       SI/Hip Screen- Lower Quarter Clearing    ASIS compression Positive  -MO    Posterior thigh sheer Bilateral:;Positive  -MO       Special Tests/Palpation    Special Tests/Palpation Lumbar/SI;Hip  -MO       Lumbosacral Accessory Motions    Lumbosacral Accessory Motions Tested? Yes  -MO       Lumbosacral Palpation    Lumbosacral Palpation? Yes  -MO    SI Bilateral:;Tender  -MO    Piriformis Bilateral:;Tender;Guarded/taut  -MO    Gluteus Jeffrey Bilateral:;Tender;Guarded/taut  -MO    Erector Spinae (Paraspinals) Bilateral:;Tender  -MO    Adductors Right:;Guarded/taut;Tender  -MO    IT Band Bilateral:;Guarded/taut  -MO       Hip/Thigh Palpation    Hip/Thigh Palpation? Yes  -MO       General ROM    GENERAL ROM COMMENTS Hip IR/ER WNL  -MO       MMT (Manual Muscle Testing)    Rt Lower Ext Rt Hip ABduction;Rt Hip Extension  -MO    Lt Lower Ext Lt Hip ABduction;Lt Hip Extension  -MO       MMT Right Lower Ext    Rt Hip Extension MMT, Gross Movement (4-/5) good minus  -MO    Rt Hip ABduction MMT, Gross Movement (3+/5) fair plus  -MO       MMT Left Lower Ext    Lt Hip Extension MMT, Gross Movement (4-/5) good minus  -MO    Lt Hip ABduction MMT, Gross Movement (4-/5) good minus  -MO       Flexibility    Flexibility Tested? Lower Extremity  -MO       Lower Extremity Flexibility    Hamstrings WNL  -MO    ITB --  mildly tender to palpation BL  -MO    Hip External Rotators WNL  -MO    Hip Internal Rotators WNL  -MO              User Key  (r) = Recorded By, (t) = Taken By, (c) = Cosigned By       Initials Name Provider Type    Lou Cervantes, PT Physical Therapist                                Therapy Education  Education Details: Discussed therapy findings. Initiated HEP: [id number]  Given: HEP, Symptoms/condition management  Program: New  How Provided: Verbal, Written, Demonstration  Provided to: Patient  Level of Understanding: Verbalized, Demonstrated      PT OP Goals       Row Name 05/19/25 0800          PT Short Term Goals    STG Date to Achieve 06/18/25  -MO     STG 1 Patient will be independent with education for symptom management and initial HEP  -MO     STG 1 Progress New  -MO     STG 2 Pt will demonstrate log roll techniques without cueing when getting on<>off mat table to reduce abdominal and low back strain.  -MO     STG 2 Progress New  -MO     STG 3 Pt will demo appropriate core activation during functional tasks with minimal verbal cueing for long term managment of condition.  -MO     STG 3 Progress New  -MO     STG 4 Pt will tolerate 20-30 minutes of walking or standing activity with minimal increase in hip pain for improved tolerance to recreational activity.  -MO     STG 4 Progress New  -MO        Long Term Goals    LTG Date to Achieve 07/18/25  -MO     LTG 1 Patient will be independent with education for symptom management and advanced HEP  -MO     LTG 1 Progress New  -MO     LTG 2 Pt will improve BL hip ext and abd strength to >/= 4+/5 to support lumbopelvic stability throughout pregnancy.  -MO     LTG 2 Progress New  -MO     LTG 3 Pt will subjectively report 50% improvement in R hip pain symptoms for return to recreational activities.  -MO     LTG 3 Progress New  -MO     LTG 4 Pt will verbalize understanding and demo proper body mechanics with sit to stand, stair navigation, and recreational activities without onset of pain.  -MO     LTG 4 Progress New  -MO        Time Calculation    PT Goal Re-Cert Due Date 08/17/25  -MO               User Key  (r) = Recorded By, (t) = Taken By, (c) =  Cosigned By      Initials Name Provider Type    Lou Cervantes, PT Physical Therapist                     PT Assessment/Plan       Row Name 05/19/25 0900          PT Assessment    Functional Limitations Limitations in community activities;Performance in work activities;Performance in self-care ADL;Performance in leisure activities;Limitations in functional capacity and performance  -MO     Impairments Pain;Poor body mechanics;Posture;Muscle strength;Joint mobility;Impaired muscle power  -MO     Assessment Comments Stephanie Durham is a 35 y.o. female referred to physical therapy for acute onset of BL hip and low back pain secondary to pregnancy. She is 17wks 3d GA, 7G3P. Significant hx includes subchronic hemorrhage on lifting restrictions. Pt presents today with very high irritability pain levels, changes position frequently throughout session secondary to positional intolerance, notable challenge with transitional movements. She demos glute weakness BL, gross tenderness to palpation all around hip girdle, R>L, (+) SIJ special testing, all ROM WFL. 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 Pt will benefit from skilled PT to address the previous impairments and return to PLOF.  -MO     Please refer to paper survey for additional self-reported information No  -MO     Rehab Potential Good  -MO     Patient/caregiver participated in establishment of treatment plan and goals Yes  -MO     Patient would benefit from skilled therapy intervention Yes  -MO        PT Plan    PT Frequency 2x/week;1x/week  -MO     Predicted Duration of Therapy Intervention (PT) 10 visits  -MO     Planned CPT's? PT EVAL LOW COMPLEXITY: 31021;PT RE-EVAL: 71952;PT THER PROC EA 15 MIN: 24992;PT THER ACT EA 15 MIN: 19956;PT MANUAL THERAPY EA 15 MIN: 59792;PT NEUROMUSC RE-EDUCATION EA 15 MIN:  88429;PT SELF CARE/HOME MGMT/TRAIN EA 15: 11610  -MO     PT Plan Comments nustep if tolerance. Likely start with manual to R hip flexor and adductor to help with pain control. SB roll out, PPT or pelvic clock on SB, PPT, PPT w/ march, TrA + PF. Potentially transition between supine and standing exercises for tolerance. Education on posture, limiting knee hyperextension and increased lumbar lordosis. PPT + bridge, HF stretch- potentially modified oscar test off mat table if tolerable.  -MO               User Key  (r) = Recorded By, (t) = Taken By, (c) = Cosigned By      Initials Name Provider Type    Lou Cervantes, PT Physical Therapist                       OP Exercises       Row Name 05/19/25 0800             Total Minutes    58009 - PT Therapeutic Exercise Minutes 10  -MO         Exercise 2    Exercise Name 2 Supine PPT  -MO      Cueing 2 Verbal  -MO      Sets 2 1  -MO      Reps 2 10  -MO      Additional Comments discussed completing several positions throughout the day  -MO         Exercise 3    Exercise Name 3 HL abd, add  -MO      Cueing 3 Verbal  -MO      Sets 3 1e  -MO      Reps 3 10  -MO      Time 3 RTB, small ball  -MO      Additional Comments add in subpain for alalgesic effect  -MO         Exercise 4    Exercise Name 4 butterfly stretch  -MO      Cueing 4 Verbal  -MO      Sets 4 1e  -MO      Reps 4 30s  -MO      Time 4 discussed moving feet per tolerance to adductor stretch  -MO      Additional Comments PPT to minimize LBP  -MO                User Key  (r) = Recorded By, (t) = Taken By, (c) = Cosigned By      Initials Name Provider Type    Lou Cervantes, PT Physical Therapist                                  Outcome Measure Options: Modified Oswestry  Modified Oswestry  Modified Oswestry Score/Comments: 22/45 -- lifting not included as she is on restrictions      Time Calculation:     Start Time: 0745  Stop Time: 0823  Time Calculation (min): 38 min  Timed Charges  01905 - PT Therapeutic Exercise  Minutes: 10  Untimed Charges  PT Eval/Re-eval Minutes: 28  Total Minutes  Timed Charges Total Minutes: 10  Untimed Charges Total Minutes: 28   Total Minutes: 38     Therapy Charges for Today       Code Description Service Date Service Provider Modifiers Qty    13449497933 HC PT THER PROC EA 15 MIN 5/19/2025 Lou Perry, PT GP 1    63497329214 HC PT EVAL LOW COMPLEXITY 2 5/19/2025 Lou Perry, PT GP 1            PT G-Codes  Outcome Measure Options: Modified Oswestry  Modified Oswestry Score/Comments: 22/45 -- lifting not included as she is on restrictions         Lou Perry, PT  5/19/2025

## 2025-05-21 ENCOUNTER — TELEPHONE (OUTPATIENT)
Dept: OBSTETRICS AND GYNECOLOGY | Age: 35
End: 2025-05-21
Payer: COMMERCIAL

## 2025-05-21 ENCOUNTER — ROUTINE PRENATAL (OUTPATIENT)
Dept: OBSTETRICS AND GYNECOLOGY | Age: 35
End: 2025-05-21
Payer: COMMERCIAL

## 2025-05-21 VITALS — BODY MASS INDEX: 18.45 KG/M2 | WEIGHT: 125 LBS | DIASTOLIC BLOOD PRESSURE: 68 MMHG | SYSTOLIC BLOOD PRESSURE: 112 MMHG

## 2025-05-21 DIAGNOSIS — O20.8 SUBCHORIONIC HEMORRHAGE IN FIRST TRIMESTER: ICD-10-CM

## 2025-05-21 DIAGNOSIS — O14.90 PRE-ECLAMPSIA, ANTEPARTUM: ICD-10-CM

## 2025-05-21 DIAGNOSIS — O46.92 VAGINAL BLEEDING IN PREGNANCY, SECOND TRIMESTER: Primary | ICD-10-CM

## 2025-05-21 DIAGNOSIS — Z13.0 SCREENING FOR IRON DEFICIENCY ANEMIA: ICD-10-CM

## 2025-05-21 DIAGNOSIS — Z13.89 SCREENING FOR HEMATURIA OR PROTEINURIA: ICD-10-CM

## 2025-05-21 DIAGNOSIS — N96 RECURRENT PREGNANCY LOSS: ICD-10-CM

## 2025-05-21 DIAGNOSIS — O44.00 PLACENTA PREVIA ANTEPARTUM: ICD-10-CM

## 2025-05-21 LAB
GLUCOSE UR STRIP-MCNC: NEGATIVE MG/DL
PROT UR STRIP-MCNC: NEGATIVE MG/DL

## 2025-05-21 NOTE — TELEPHONE ENCOUNTER
Pt is 17w5d calling c/o light brown discharge. Pt denies any bright red bleeding or any pelvic pain/cramping. Pt stating she has hx of miscarriage & wanted to make sure she does not need to be concerned. Pt sees MFM on 5/27/25 & has appt scheduled in office with PA 5/29/25. Please advise if pt should continue to monitor & keep scheduled appts or if pt needs to be seen sooner.

## 2025-05-21 NOTE — PROGRESS NOTES
Chief Complaint   Patient presents with    Routine Prenatal Visit     17w5d, pt c/o brown spotting that started this morning and has been going on all day. Pt denies any bad cramping. U/s today to evaluate        HPI: 35 y.o.  at 17w5d gestation  She noted brown d/c today  Denies recent IC, straining with BM or vomiting  Did have a vasovagal episode on Monday and wondered if t hat cause the d/c  She is prone to passing out and does not feel this was anything alarming  She has h/o anemia with first pregnancy  Will check CBC today  U/s was done today and her CL measures 4 cm, no funneling noted  Possible accessory lobe vs focal contraction seen that measures 1.4 cm from internal os  This will be further addressed at her MFM visit  She has h/o KINZA seen on an u/s at 7 wks and has been on pelvic rest since then  She has f/u with MFM on the  d/ h/o recurrent loss  C/w pelvic rest  Call for any other concerns  Vitals:    25 1507   BP: 112/68   Weight: 56.7 kg (125 lb)       ROS:  GI:  Negative  : na  Pulmonary: Negative     A/P  1. Intrauterine pregnancy at 17w5d   2. Pregnancy Risk:  HIGH RISK    Diagnoses and all orders for this visit:    1. Vaginal bleeding in pregnancy, second trimester (Primary)    2. Placenta previa antepartum    3. Subchorionic hemorrhage in first trimester    4. Recurrent pregnancy loss    5. History of preeclampsia    6. Screening for hematuria or proteinuria  -     POC Urinalysis Dipstick    7. Screening for iron deficiency anemia  -     CBC & Differential        -----------------------  PLAN:   Return if symptoms worsen or fail to improve, for as scheduled on the .      ALEAH Street  2025 15:19 EDT

## 2025-05-22 LAB
BASOPHILS # BLD AUTO: 0.05 10*3/MM3 (ref 0–0.2)
BASOPHILS NFR BLD AUTO: 0.4 % (ref 0–1.5)
EOSINOPHIL # BLD AUTO: 0.27 10*3/MM3 (ref 0–0.4)
EOSINOPHIL NFR BLD AUTO: 2.1 % (ref 0.3–6.2)
ERYTHROCYTE [DISTWIDTH] IN BLOOD BY AUTOMATED COUNT: 11.5 % (ref 12.3–15.4)
HCT VFR BLD AUTO: 36.3 % (ref 34–46.6)
HGB BLD-MCNC: 12.4 G/DL (ref 12–15.9)
IMM GRANULOCYTES # BLD AUTO: 0.12 10*3/MM3 (ref 0–0.05)
IMM GRANULOCYTES NFR BLD AUTO: 0.9 % (ref 0–0.5)
LYMPHOCYTES # BLD AUTO: 2.15 10*3/MM3 (ref 0.7–3.1)
LYMPHOCYTES NFR BLD AUTO: 17 % (ref 19.6–45.3)
MCH RBC QN AUTO: 32.8 PG (ref 26.6–33)
MCHC RBC AUTO-ENTMCNC: 34.2 G/DL (ref 31.5–35.7)
MCV RBC AUTO: 96 FL (ref 79–97)
MONOCYTES # BLD AUTO: 0.71 10*3/MM3 (ref 0.1–0.9)
MONOCYTES NFR BLD AUTO: 5.6 % (ref 5–12)
NEUTROPHILS # BLD AUTO: 9.37 10*3/MM3 (ref 1.7–7)
NEUTROPHILS NFR BLD AUTO: 74 % (ref 42.7–76)
NRBC BLD AUTO-RTO: 0 /100 WBC (ref 0–0.2)
PLATELET # BLD AUTO: 306 10*3/MM3 (ref 140–450)
RBC # BLD AUTO: 3.78 10*6/MM3 (ref 3.77–5.28)
WBC # BLD AUTO: 12.67 10*3/MM3 (ref 3.4–10.8)

## 2025-05-23 ENCOUNTER — HOSPITAL ENCOUNTER (OUTPATIENT)
Dept: PHYSICAL THERAPY | Facility: HOSPITAL | Age: 35
Setting detail: THERAPIES SERIES
Discharge: HOME | End: 2025-05-23
Payer: COMMERCIAL

## 2025-05-23 DIAGNOSIS — M25.551 BILATERAL HIP PAIN: Primary | ICD-10-CM

## 2025-05-23 DIAGNOSIS — M25.552 BILATERAL HIP PAIN: Primary | ICD-10-CM

## 2025-05-23 DIAGNOSIS — M54.50 ACUTE MIDLINE LOW BACK PAIN WITHOUT SCIATICA: ICD-10-CM

## 2025-05-23 DIAGNOSIS — R29.898 HIP WEAKNESS: ICD-10-CM

## 2025-05-23 PROCEDURE — 97140 MANUAL THERAPY 1/> REGIONS: CPT

## 2025-05-23 PROCEDURE — 97110 THERAPEUTIC EXERCISES: CPT

## 2025-05-23 NOTE — THERAPY TREATMENT NOTE
Outpatient Physical Therapy Ortho Treatment Note  [facility]     Patient Name: Stephanie Durham  : 1990  MRN: [id number]  Today's Date: 2025      Visit Date: 2025    Visit Dx:    ICD-10-CM ICD-9-CM   1. Bilateral hip pain  M25.551 719.45    M25.552    2. Acute midline low back pain without sciatica  M54.50 724.2   3. Hip weakness  R29.898 729.89       Patient Active Problem List   Diagnosis    Galactorrhea not associated with childbirth    Nipple discharge    Abnormal finding on breast imaging    Family history of breast cancer    Breast pain    History of preeclampsia    Recurrent pregnancy loss    Subchorionic hemorrhage in first trimester    Breast pain, left    Placenta previa antepartum        Past Medical History:   Diagnosis Date    Anemia     Anxiety     panic attacks    Breast lump     on left    Female infertility     Gestational hypertension 2017    Headache     History of medical problems     Hypertension     preeclapmsia with pregnancy    Migraine     Ovarian cyst 2015    Preeclampsia 2017    Recurrent pregnancy loss, antepartum condition or complication 2016    3 miscarriages    Urinary tract infection     Visual impairment     corneal abrasions        Past Surgical History:   Procedure Laterality Date    BREAST SURGERY Left 2017    Left Breast Biopsy    WISDOM TOOTH EXTRACTION                          PT Assessment/Plan       Row Name 25 1400          PT Assessment    Assessment Comments Pt rturns for first follow up session reporting recent aginal bleeding and sycopal episode which are being  investigated by OB/GYN and MFM. Performed supported activities including clamshell, PPT, ball roll all with good tolerance within limited ROM and initiated manual therapy R piri/glutes/AD. Discussed body mechanics, specifically during supine <> sit, pt with reports of decreased pain with performance.  -RS        PT Plan    PT Plan Comments follow up rgarding MFM,  thoracic rotataion seated PPT, HL arch  -RS               User Key  (r) = Recorded By, (t) = Taken By, (c) = Cosigned By      Initials Name Provider Type    RS Venice Connor, PT Physical Therapist                       OP Exercises       Row Name 05/23/25 1400             Subjective    Subjective Comments pt reports she has not done uch this week because she passed out last week and has experienced vaginal bleeding, she is going to [facility] next week  -RS         Subjective Pain    Able to rate subjective pain? yes  -RS         Total Minutes    72674 - PT Therapeutic Exercise Minutes 28  -RS      72537 - PT Manual Therapy Minutes 12  -RS         Exercise 1    Exercise Name 1 LTR  -RS      Cueing 1 Verbal;Demo  -RS      Reps 1 10ea  -RS      Time 1 small ROM  -RS         Exercise 2    Exercise Name 2 supine PPT with AD  -RS      Cueing 2 Verbal;Demo  -RS      Reps 2 15  -RS      Time 2 ball  -RS         Exercise 3    Exercise Name 3 HL hip AB with PPT  -RS      Cueing 3 Verbal;Demo  -RS      Reps 3 10ea  -RS      Time 3 RTB  -RS         Exercise 4    Exercise Name 4 sl clam small ROM  -RS      Cueing 4 Verbal;Demo  -RS      Reps 4 10  -RS         Exercise 5    Exercise Name 5 body mechanics review- log roll  -RS         Exercise 6    Exercise Name 6 seted ball roll  -RS      Cueing 6 Verbal;Demo  -RS      Time 6 10 front  -RS                User Key  (r) = Recorded By, (t) = Taken By, (c) = Cosigned By      Initials Name Provider Type    RS Venice Connor, PT Physical Therapist                             Manual Rx (Last 36 Hours)       Manual Treatments       Row Name 05/23/25 1400             Total Minutes    41808 - PT Manual Therapy Minutes 12  -RS         Manual Rx 1    Manual Rx 1 Location STM R hip AD, piriformis  -RS                User Key  (r) = Recorded By, (t) = Taken By, (c) = Cosigned By      Initials Name Provider Type    RS Venice Connor, PT Physical Therapist                     PT OP Goals        Row Name 05/23/25 1300          PT Short Term Goals    STG Date to Achieve 06/18/25  -RS     STG 1 Patient will be independent with education for symptom management and initial HEP  -RS     STG 1 Progress Ongoing  -RS     STG 2 Pt will demonstrate log roll techniques without cueing when getting on<>off mat table to reduce abdominal and low back strain.  -RS     STG 2 Progress Ongoing  -RS     STG 3 Pt will demo appropriate core activation during functional tasks with minimal verbal cueing for long term managment of condition.  -RS     STG 3 Progress Ongoing  -RS     STG 4 Pt will tolerate 20-30 minutes of walking or standing activity with minimal increase in hip pain for improved tolerance to recreational activity.  -RS     STG 4 Progress Ongoing  -RS        Long Term Goals    LTG Date to Achieve 07/18/25  -RS     LTG 1 Patient will be independent with education for symptom management and advanced HEP  -RS     LTG 1 Progress Ongoing  -RS     LTG 2 Pt will improve BL hip ext and abd strength to >/= 4+/5 to support lumbopelvic stability throughout pregnancy.  -RS     LTG 2 Progress Ongoing  -RS     LTG 3 Pt will subjectively report 50% improvement in R hip pain symptoms for return to recreational activities.  -RS     LTG 3 Progress Ongoing  -RS     LTG 4 Pt will verbalize understanding and demo proper body mechanics with sit to stand, stair navigation, and recreational activities without onset of pain.  -RS     LTG 4 Progress Ongoing  -RS               User Key  (r) = Recorded By, (t) = Taken By, (c) = Cosigned By      Initials Name Provider Type    RS Venice Connor, PT Physical Therapist                    Therapy Education  Given: HEP, Posture/body mechanics, Pain management  Program: Reinforced  How Provided: Verbal, Demonstration  Provided to: Patient  Level of Understanding: Verbalized, Demonstrated              Time Calculation:   Start Time: 1330  Stop Time: 1415  Time Calculation (min): 45 min  Timed  Charges  50148 - PT Therapeutic Exercise Minutes: 28  92955 - PT Manual Therapy Minutes: 12  Total Minutes  Timed Charges Total Minutes: 40   Total Minutes: 40  Therapy Charges for Today       Code Description Service Date Service Provider Modifiers Qty    30061506199  PT THER PROC EA 15 MIN 5/23/2025 Venice Connor, PT GP 2    [id number]  PT MANUAL THERAPY EA 15 MIN 5/23/2025 Venice Connor, PT GP 1                      Venice Connor PT  5/23/2025

## 2025-05-24 PROBLEM — O09.299 HISTORY OF PRE-ECLAMPSIA IN PRIOR PREGNANCY, CURRENTLY PREGNANT: Status: ACTIVE | Noted: 2025-05-24

## 2025-05-24 NOTE — PROGRESS NOTES
MATERNAL FETAL MEDICINE Consult Note    Dear Dr Radha Spears MD:    Thank you for your kind referral of Stephanie Durham.  As you know, she is a 35 y.o.   18w1d gestation (Estimated Date of Delivery: 10/24/25). This is a consult.      Her antepartum course is complicated by:  - Past history of multiple miscarriage  - Possible accessory lobe   - Past history of pre eclampsia    Aneuploidy Screening: [facility] Low Risk male  Carrier Screening: Declined ... Reports negative in the past    HPI: Today, she feels well. She has no new problems to report.     Review of History:  Past Medical History:   Diagnosis Date    Anemia     Anxiety     panic attacks    Breast lump     on left- followed by Malka Dudley next appointment in 2025    Female infertility     Gestational hypertension 2017    Hypertension     preeclapmsia with pregnancy    Migraine 2008    occular migraines    Ovarian cyst 2015    Preeclampsia 2017    2017 and 2019    Recurrent pregnancy loss, antepartum condition or complication 2016    3 miscarriages    Urinary tract infection     Visual impairment     corneal abrasions     Past Surgical History:   Procedure Laterality Date    BREAST SURGERY Left 2017    Left Breast Biopsy    WISDOM TOOTH EXTRACTION         OB Hx:  OB History    Para Term  AB Living   7 3 2 1 3 3   SAB IAB Ectopic Molar Multiple Live Births   3     3      # Outcome Date GA Lbr Eamon/2nd Weight Sex Type Anes PTL Lv   7 Current            6 SAB 10/04/24 7w0d    SAB      5 Term 07/10/19 37w5d 07:49 / 00:05 2860 g (6 lb 4.9 oz) F Vag-Spont EPI N CINDY   4 SAB 18 9w0d    SAB      3  17 37w0d  2920 g (6 lb 7 oz) F Vag-Spont   CINDY      Complications: Mild pre-eclampsia   2 SAB 2016 8w0d    SAB      1 Term 10/14/13 40w4d  3147 g (6 lb 15 oz) M Vag-Spont   CINDY       Social History     Socioeconomic History    Marital status:    Tobacco Use    Smoking status: Never     Passive exposure:  Never    Smokeless tobacco: Never   Vaping Use    Vaping status: Never Used   Substance and Sexual Activity    Alcohol use: Never    Drug use: Never    Sexual activity: Not Currently     Partners: Male     Birth control/protection: None     Family History   Problem Relation Age of Onset    Mental illness Mother     Arthritis Mother     Anxiety disorder Mother     Cancer Father     Melanoma Father     Learning disabilities Brother     Thyroid disease Maternal Grandmother     Breast cancer Maternal Grandmother 82    Lung cancer Maternal Grandmother       Allergies   Allergen Reactions    Adhesive Tape Hives     bandaid brand      Current Outpatient Medications on File Prior to Visit   Medication Sig Dispense Refill    aspirin 81 MG chewable tablet Chew 1 tablet Daily.      prenatal vitamin (prenatal, CLASSIC, vitamin) tablet Take  by mouth Daily.       No current facility-administered medications on file prior to visit.      Past obstetric, gynecological, medical, surgical, family and social history reviewed.  Relevant lab work and imaging reviewed.    Review of systems  Constitutional:  denies fever, chills, malaise.   ENT/Mouth:  denies sore throat, tinnitus  Eyes: denies vision changes/pain  CV:  denies chest pain  Respiratory:  denies cough/SOB  GI:  denies N/V, diarrhea, abdominal pain.    :   denies dysuria  Skin:  denies lesions or pruritus   Neuro:  denies weakness, focal neurologic symptoms    Vitals:    05/27/25 0730   BP: 123/70   BP Location: Right arm   Patient Position: Sitting   Pulse: 65   Temp: 98 °F (36.7 °C)   TempSrc: Temporal   SpO2: 98%   Weight: 57.8 kg (127 lb 6.4 oz)       PHYSICAL EXAM   GENERAL: Not in acute distress, AAOx3, pleasant  NEURO: awake, alert and oriented to person, place, and time    LABS:   Lupus labs normal except elevated ACL IgM    ULTRASOUND   Please view full ultrasound note on Imaging tab in ViewPoint.  Cephalic  Anterior placenta (no evidence of accessory lobe)  EFW  231 grams, 8 oz, 27 %, AC 46%  Amniotic fluid normal - MVP 3.9 cm  Cervical length 41 mm   Fetal anatomy appears normal, Limited fetal spine views - 5-6 week follow up recommended    ASSESSMENT/COUNSELIN y.o.   18w1d gestation (Estimated Date of Delivery: 10/24/25).       -Pregnancy  [ X ] stable  [   ] improving [  ] worsening    Diagnoses and all orders for this visit:    1. Recurrent pregnancy loss (Primary)  Comments:  APL work up negative with the exception of modestly elevated ACL IgM. LAC negative  Orders:  -     [facility]; Standing    2. History of pre-eclampsia in prior pregnancy, currently pregnant       DISCUSSION  Mrs. Durham has a history of multiple miscarriages.  However, she has also had several successful pregnancies with the same partner.  Her losses have all been between 7 and 9 weeks gestation.  She is now beyond 18 weeks gestation with a normal developing pregnancy.    Her laboratory evaluation showed that she was negative for lupus anticoagulant.  The only positive lab was for a anticardiolipin IgM that was elevated.  This is of no clinical significance and does not require anticoagulation.    Summary of Plan  - Repeat MFM scan in 5-6 weeks for growth, completion and anatomy  - Third trimester growth scans should be done every 4-6 weeks (precaution for ACL IgM). Can be done with primary OB  - Antepartum surveillance according to clinical circumstances.   -Starting at 28 weeks: Fetal movement instructions given continue daily until delivery; instructed to report to labor and delivery if cannot achieve more than 10 kicks in one hour or if she perceives a decrease in fetal movement    Follow-up: If the next MFM scan is normal, further MFM visits are not required.     Thank you for the consult and opportunity to care for this patient.  Please feel free to reach out with any questions or concerns.      I spent 30 minutes caring for this patient on this date of  service. This time includes time spent by me in the following activities: preparing for the visit, reviewing tests, obtaining and/or reviewing a separately obtained history, performing a medically appropriate examination and/or evaluation, counseling and educating the patient/family/caregiver and independently interpreting results and communicating that information with the patient/family/caregiver with greater than 50% spent in counseling and coordination of care. This time did NOT include time for interpretation of imaging or electronic fetal monitoring.     Freddy Huffman MD FACOG  Maternal Fetal Medicine-[facility]  Office: [phone]  [email or web address]

## 2025-05-27 ENCOUNTER — HOSPITAL ENCOUNTER (OUTPATIENT)
Dept: ULTRASOUND IMAGING | Facility: HOSPITAL | Age: 35
Discharge: HOME | End: 2025-05-27
Admitting: OBSTETRICS & GYNECOLOGY
Payer: COMMERCIAL

## 2025-05-27 ENCOUNTER — OFFICE VISIT (OUTPATIENT)
Dept: OBSTETRICS AND GYNECOLOGY | Facility: CLINIC | Age: 35
End: 2025-05-27
Payer: COMMERCIAL

## 2025-05-27 VITALS
WEIGHT: 127.4 LBS | BODY MASS INDEX: 18.8 KG/M2 | DIASTOLIC BLOOD PRESSURE: 70 MMHG | TEMPERATURE: 98 F | OXYGEN SATURATION: 98 % | HEART RATE: 65 BPM | SYSTOLIC BLOOD PRESSURE: 123 MMHG

## 2025-05-27 DIAGNOSIS — N96 RECURRENT PREGNANCY LOSS: ICD-10-CM

## 2025-05-27 DIAGNOSIS — N96 RECURRENT PREGNANCY LOSS: Primary | ICD-10-CM

## 2025-05-27 DIAGNOSIS — O09.299 HISTORY OF PRE-ECLAMPSIA IN PRIOR PREGNANCY, CURRENTLY PREGNANT: ICD-10-CM

## 2025-05-27 PROCEDURE — 76811 OB US DETAILED SNGL FETUS: CPT

## 2025-05-27 NOTE — LETTER
May 27, 2025     Radha Spears MD  [address]  [address]  [place] [id number]    Patient: Stephanie Durham   YOB: 1990   Date of Visit: 2025       Dear Radha Spears MD,    Thank you for referring Stephanie Durham to me for evaluation. Below is a copy of my consult note.    If you have questions, please do not hesitate to call me. I look forward to following Stephanie along with you.         Sincerely,        Freddy Huffman MD        CC: No Recipients    MATERNAL FETAL MEDICINE Consult Note    Dear Dr Radha Spears MD:    Thank you for your kind referral of Stephanie Durham.  As you know, she is a 35 y.o.   18w1d gestation (Estimated Date of Delivery: 10/24/25). This is a consult.      Her antepartum course is complicated by:  - Past history of multiple miscarriage  - Possible accessory lobe   - Past history of pre eclampsia    Aneuploidy Screening: [facility] Low Risk male  Carrier Screening: Declined ... Reports negative in the past    HPI: Today, she feels well. She has no new problems to report.     Review of History:  Past Medical History:   Diagnosis Date   • Anemia    • Anxiety     panic attacks   • Breast lump     on left- followed by Malka Dudley next appointment in 2025   • Female infertility    • Gestational hypertension    • Hypertension     preeclapmsia with pregnancy   • Migraine 2008    occular migraines   • Ovarian cyst 2015   • Preeclampsia 2017    2017 and 2019   • Recurrent pregnancy loss, antepartum condition or complication 2016    3 miscarriages   • Urinary tract infection    • Visual impairment     corneal abrasions     Past Surgical History:   Procedure Laterality Date   • BREAST SURGERY Left 2017    Left Breast Biopsy   • WISDOM TOOTH EXTRACTION  2008       OB Hx:  OB History    Para Term  AB Living   7 3 2 1 3 3   SAB IAB Ectopic Molar Multiple Live Births   3     3      # Outcome Date GA Lbr Eamon/2nd Weight Sex  Type Anes PTL Lv   7 Current            6 SAB 10/04/24 7w0d    SAB      5 Term 07/10/19 37w5d 07:49 / 00:05 2860 g (6 lb 4.9 oz) F Vag-Spont EPI N CINDY   4 SAB 18 9w0d    SAB      3  17 37w0d  2920 g (6 lb 7 oz) F Vag-Spont   CINDY      Complications: Mild pre-eclampsia   2 SAB 2016 8w0d    SAB      1 Term 10/14/13 40w4d  3147 g (6 lb 15 oz) M Vag-Spont   CINDY       Social History     Socioeconomic History   • Marital status:    Tobacco Use   • Smoking status: Never     Passive exposure: Never   • Smokeless tobacco: Never   Vaping Use   • Vaping status: Never Used   Substance and Sexual Activity   • Alcohol use: Never   • Drug use: Never   • Sexual activity: Not Currently     Partners: Male     Birth control/protection: None     Family History   Problem Relation Age of Onset   • Mental illness Mother    • Arthritis Mother    • Anxiety disorder Mother    • Cancer Father    • Melanoma Father    • Learning disabilities Brother    • Thyroid disease Maternal Grandmother    • Breast cancer Maternal Grandmother 82   • Lung cancer Maternal Grandmother       Allergies   Allergen Reactions   • Adhesive Tape Hives     bandaid brand      Current Outpatient Medications on File Prior to Visit   Medication Sig Dispense Refill   • aspirin 81 MG chewable tablet Chew 1 tablet Daily.     • prenatal vitamin (prenatal, CLASSIC, vitamin) tablet Take  by mouth Daily.       No current facility-administered medications on file prior to visit.      Past obstetric, gynecological, medical, surgical, family and social history reviewed.  Relevant lab work and imaging reviewed.    Review of systems  Constitutional:  denies fever, chills, malaise.   ENT/Mouth:  denies sore throat, tinnitus  Eyes: denies vision changes/pain  CV:  denies chest pain  Respiratory:  denies cough/SOB  GI:  denies N/V, diarrhea, abdominal pain.    :   denies dysuria  Skin:  denies lesions or pruritus   Neuro:  denies weakness, focal neurologic  symptoms    Vitals:    25 0730   BP: 123/70   BP Location: Right arm   Patient Position: Sitting   Pulse: 65   Temp: 98 °F (36.7 °C)   TempSrc: Temporal   SpO2: 98%   Weight: 57.8 kg (127 lb 6.4 oz)       PHYSICAL EXAM   GENERAL: Not in acute distress, AAOx3, pleasant  NEURO: awake, alert and oriented to person, place, and time    LABS:   Lupus labs normal except elevated ACL IgM    ULTRASOUND   Please view full ultrasound note on Imaging tab in ViewPoint.  Cephalic  Anterior placenta (no evidence of accessory lobe)   grams, 8 oz, 27 %, AC 46%  Amniotic fluid normal - MVP 3.9 cm  Cervical length 41 mm   Fetal anatomy appears normal, Limited fetal spine views - 5-6 week follow up recommended    ASSESSMENT/COUNSELIN y.o.   18w1d gestation (Estimated Date of Delivery: 10/24/25).       -Pregnancy  [ X ] stable  [   ] improving [  ] worsening    Diagnoses and all orders for this visit:    1. Recurrent pregnancy loss (Primary)  Comments:  APL work up negative with the exception of modestly elevated ACL IgM. LAC negative  Orders:  -     [facility]; Standing    2. History of pre-eclampsia in prior pregnancy, currently pregnant       DISCUSSION  Mrs. Durham has a history of multiple miscarriages.  However, she has also had several successful pregnancies with the same partner.  Her losses have all been between 7 and 9 weeks gestation.  She is now beyond 18 weeks gestation with a normal developing pregnancy.    Her laboratory evaluation showed that she was negative for lupus anticoagulant.  The only positive lab was for a anticardiolipin IgM that was elevated.  This is of no clinical significance and does not require anticoagulation.    Summary of Plan  - Repeat MFM scan in 5-6 weeks for growth, completion and anatomy  - Third trimester growth scans should be done every 4-6 weeks (precaution for ACL IgM). Can be done with primary OB  - Antepartum surveillance according to  clinical circumstances.   -Starting at 28 weeks: Fetal movement instructions given continue daily until delivery; instructed to report to labor and delivery if cannot achieve more than 10 kicks in one hour or if she perceives a decrease in fetal movement    Follow-up: If the next MFM scan is normal, further MFM visits are not required.     Thank you for the consult and opportunity to care for this patient.  Please feel free to reach out with any questions or concerns.      I spent 30 minutes caring for this patient on this date of service. This time includes time spent by me in the following activities: preparing for the visit, reviewing tests, obtaining and/or reviewing a separately obtained history, performing a medically appropriate examination and/or evaluation, counseling and educating the patient/family/caregiver and independently interpreting results and communicating that information with the patient/family/caregiver with greater than 50% spent in counseling and coordination of care. This time did NOT include time for interpretation of imaging or electronic fetal monitoring.     Freddy Huffman MD FACOG  Maternal Fetal Medicine-[facility]  Office: [phone]  [email or web address]          Pt reports that she is doing well and denies vaginal bleeding, cramping, contractions or LOF at this time. Notes brown spotting on 5/21 and was evaluated by OB office- denies any bleeding or spotting since. Reports active fetal movement. Patient aware of when to call OB office or present to L&D for evaluation with symptoms such as decreased fetal movement, vaginal bleeding, LOF or ctxs. Reports hx of ocular migraines, denies visual changes or epigastric pain. Denies any additional complaints at time of appointment. Next OB appointment scheduled for 5/29.    Vitals:    05/27/25 0730   BP: 123/70   Pulse: 65   Temp: 98 °F (36.7 °C)   SpO2: 98%

## 2025-05-27 NOTE — PROGRESS NOTES
Pt reports that she is doing well and denies vaginal bleeding, cramping, contractions or LOF at this time. Notes brown spotting on 5/21 and was evaluated by OB office- denies any bleeding or spotting since. Reports active fetal movement. Patient aware of when to call OB office or present to L&D for evaluation with symptoms such as decreased fetal movement, vaginal bleeding, LOF or ctxs. Reports hx of ocular migraines, denies visual changes or epigastric pain. Denies any additional complaints at time of appointment. Next OB appointment scheduled for 5/29.    Vitals:    05/27/25 0730   BP: 123/70   Pulse: 65   Temp: 98 °F (36.7 °C)   SpO2: 98%

## 2025-05-29 ENCOUNTER — ROUTINE PRENATAL (OUTPATIENT)
Dept: OBSTETRICS AND GYNECOLOGY | Age: 35
End: 2025-05-29
Payer: COMMERCIAL

## 2025-05-29 ENCOUNTER — HOSPITAL ENCOUNTER (OUTPATIENT)
Dept: PHYSICAL THERAPY | Facility: HOSPITAL | Age: 35
Setting detail: THERAPIES SERIES
Discharge: HOME | End: 2025-05-29
Payer: COMMERCIAL

## 2025-05-29 VITALS — SYSTOLIC BLOOD PRESSURE: 122 MMHG | DIASTOLIC BLOOD PRESSURE: 70 MMHG | WEIGHT: 127 LBS | BODY MASS INDEX: 18.75 KG/M2

## 2025-05-29 DIAGNOSIS — N96 RECURRENT PREGNANCY LOSS: ICD-10-CM

## 2025-05-29 DIAGNOSIS — O09.299 HISTORY OF PRE-ECLAMPSIA IN PRIOR PREGNANCY, CURRENTLY PREGNANT: ICD-10-CM

## 2025-05-29 DIAGNOSIS — Z3A.18 18 WEEKS GESTATION OF PREGNANCY: Primary | ICD-10-CM

## 2025-05-29 DIAGNOSIS — R29.898 HIP WEAKNESS: ICD-10-CM

## 2025-05-29 DIAGNOSIS — M25.552 BILATERAL HIP PAIN: Primary | ICD-10-CM

## 2025-05-29 DIAGNOSIS — M25.551 BILATERAL HIP PAIN: Primary | ICD-10-CM

## 2025-05-29 DIAGNOSIS — O20.8 SUBCHORIONIC HEMORRHAGE IN FIRST TRIMESTER: ICD-10-CM

## 2025-05-29 DIAGNOSIS — R35.0 URINARY FREQUENCY: ICD-10-CM

## 2025-05-29 DIAGNOSIS — M54.50 ACUTE MIDLINE LOW BACK PAIN WITHOUT SCIATICA: ICD-10-CM

## 2025-05-29 LAB
GLUCOSE UR STRIP-MCNC: NEGATIVE MG/DL
PROT UR STRIP-MCNC: NEGATIVE MG/DL

## 2025-05-29 PROCEDURE — 97140 MANUAL THERAPY 1/> REGIONS: CPT

## 2025-05-29 PROCEDURE — 97110 THERAPEUTIC EXERCISES: CPT

## 2025-05-29 NOTE — THERAPY TREATMENT NOTE
Outpatient Physical Therapy Ortho Treatment Note  [facility]     Patient Name: Stephanie Durham  : 1990  MRN: [id number]  Today's Date: 2025      Visit Date: 2025    Visit Dx:    ICD-10-CM ICD-9-CM   1. Bilateral hip pain  M25.551 719.45    M25.552    2. Acute midline low back pain without sciatica  M54.50 724.2   3. Hip weakness  R29.898 729.89       Patient Active Problem List   Diagnosis    Galactorrhea not associated with childbirth    Nipple discharge    Abnormal finding on breast imaging    Family history of breast cancer    Breast pain    History of preeclampsia    Recurrent pregnancy loss    Subchorionic hemorrhage in first trimester    Breast pain, left    Placenta previa antepartum    History of pre-eclampsia in prior pregnancy, currently pregnant        Past Medical History:   Diagnosis Date    Anemia     Anxiety     panic attacks    Breast lump     on left- followed by Malka Dudley next appointment in 2025    Female infertility 2020    Gestational hypertension 2017    Hypertension     preeclapmsia with pregnancy    Migraine 2008    occular migraines    Ovarian cyst 2015    Preeclampsia 2017    2017 and 2019    Recurrent pregnancy loss, antepartum condition or complication 2016    3 miscarriages    Urinary tract infection     Visual impairment     corneal abrasions        Past Surgical History:   Procedure Laterality Date    BREAST SURGERY Left 2017    Left Breast Biopsy    WISDOM TOOTH EXTRACTION                          PT Assessment/Plan       Row Name 25 0800          PT Assessment    Assessment Comments Pt reports M appointment went well overall, she has a repeat US for anatomy scan due to difficulty visualizing. She had decreased pain following last session and reports good compliance with current program. Repeated manual therapy focused on R hip/adductors with good tolerance and decreased TTP reported. Also noted improved tolerance for STS transition with  decreased reliance on UE and improved gait pattern. Pt remains appropriate for skilled PT to address pain and strength limitations.  -RS        PT Plan    PT Plan Comments repeat manual, transition to standing/seated  shoulder ext, PPT seated  -RS               User Key  (r) = Recorded By, (t) = Taken By, (c) = Cosigned By      Initials Name Provider Type    RS Venice Connor, PT Physical Therapist                       OP Exercises       Row Name 05/29/25 0700             Subjective    Subjective Comments pt reports she had less pain the rest of the day following her last treatmetn, it came back some after sleeping  -RS         Subjective Pain    Able to rate subjective pain? yes  -RS         Total Minutes    16773 - PT Therapeutic Exercise Minutes 33  -RS      00711 - PT Manual Therapy Minutes 10  -RS         Exercise 1    Exercise Name 1 LTR  -RS      Cueing 1 Verbal;Demo  -RS      Reps 1 10ea  -RS      Time 1 small ROM  -RS         Exercise 2    Exercise Name 2 supine PPT with AD  -RS      Cueing 2 Verbal;Demo  -RS      Reps 2 15  -RS      Time 2 ball  -RS         Exercise 3    Exercise Name 3 HL hip AB with PPT  -RS      Cueing 3 Verbal;Demo  -RS      Reps 3 8 ea  -RS      Time 3 RTB  -RS      Additional Comments uni, B, hug the baby  -RS         Exercise 4    Exercise Name 4 sl clam small ROM  -RS      Cueing 4 Verbal;Demo  -RS      Reps 4 10  -RS         Exercise 5    Exercise Name 5 body mechanics review- log roll  -RS         Exercise 6    Exercise Name 6 seated ball roll  -RS      Cueing 6 Verbal;Demo  -RS      Time 6 8 front, 5L  -RS         Exercise 7    Exercise Name 7 thoracic rotation small ROM  -RS      Cueing 7 Verbal;Demo  -RS      Reps 7 8  -RS      Time 7 hand behind head  -RS         Exercise 8    Exercise Name 8 figure 4 hip stretch  -RS      Cueing 8 Verbal;Demo  -RS      Reps 8 1 min  -RS                User Key  (r) = Recorded By, (t) = Taken By, (c) = Cosigned By      Initials Name  Provider Type    RS Venice Connor, PT Physical Therapist                             Manual Rx (Last 36 Hours)       Manual Treatments       Row Name 05/29/25 0700             Total Minutes    65769 - PT Manual Therapy Minutes 10  -RS         Manual Rx 1    Manual Rx 1 Location STM R hip AD, piriformis  -RS                User Key  (r) = Recorded By, (t) = Taken By, (c) = Cosigned By      Initials Name Provider Type    RS Venice Connor, PT Physical Therapist                     PT OP Goals       Row Name 05/29/25 0700          PT Short Term Goals    STG Date to Achieve 06/18/25  -RS     STG 1 Patient will be independent with education for symptom management and initial HEP  -RS     STG 1 Progress Met  -RS     STG 2 Pt will demonstrate log roll techniques without cueing when getting on<>off mat table to reduce abdominal and low back strain.  -RS     STG 2 Progress Partially Met  -RS     STG 3 Pt will demo appropriate core activation during functional tasks with minimal verbal cueing for long term managment of condition.  -RS     STG 3 Progress Ongoing  -RS     STG 4 Pt will tolerate 20-30 minutes of walking or standing activity with minimal increase in hip pain for improved tolerance to recreational activity.  -RS     STG 4 Progress Ongoing  -RS        Long Term Goals    LTG Date to Achieve 07/18/25  -RS     LTG 1 Patient will be independent with education for symptom management and advanced HEP  -RS     LTG 1 Progress Ongoing  -RS     LTG 2 Pt will improve BL hip ext and abd strength to >/= 4+/5 to support lumbopelvic stability throughout pregnancy.  -RS     LTG 2 Progress Ongoing  -RS     LTG 3 Pt will subjectively report 50% improvement in R hip pain symptoms for return to recreational activities.  -RS     LTG 3 Progress Ongoing  -RS     LTG 4 Pt will verbalize understanding and demo proper body mechanics with sit to stand, stair navigation, and recreational activities without onset of pain.  -RS     LTG 4  Progress Ongoing  -RS               User Key  (r) = Recorded By, (t) = Taken By, (c) = Cosigned By      Initials Name Provider Type    RS Venice Connor, PT Physical Therapist                    Therapy Education  Given: HEP, Posture/body mechanics  Program: Reinforced  How Provided: Verbal, Demonstration  Provided to: Patient  Level of Understanding: Verbalized, Demonstrated              Time Calculation:   Start Time: 0732  Stop Time: 0817  Time Calculation (min): 45 min  Timed Charges  81269 - PT Therapeutic Exercise Minutes: 33  42566 - PT Manual Therapy Minutes: 10  Total Minutes  Timed Charges Total Minutes: 43   Total Minutes: 43  Therapy Charges for Today       Code Description Service Date Service Provider Modifiers Qty    50171722610  PT THER PROC EA 15 MIN 5/29/2025 Venice Connor, PT GP 2    96948835326 HC PT MANUAL THERAPY EA 15 MIN 5/29/2025 Venice Connor, PT GP 1                      Venice Connor PT  5/29/2025

## 2025-05-29 NOTE — PROGRESS NOTES
Chief Complaint   Patient presents with    Routine Prenatal Visit     18 week ob visit, was seen at [facility], and does pt for hip.         HPI: 35 y.o.  at 18w6d gestation  She is doing well  Is doing PT for her hip  It is helping  She notes good FM  Did meet with [facility] and u/s report was reviewed  No accessory lobe and placenta is no longer low lying  She is planning one more f/u with them in 5-6 wks and will then be released if all wnl    Vitals:    25 1121   BP: 122/70   Weight: 57.6 kg (127 lb)       ROS:  GI:  Negative  : na  Pulmonary: Negative     A/P  1. Intrauterine pregnancy at 18w6d   2. Pregnancy Risk:  COMPLICATED    Diagnoses and all orders for this visit:    1. 18 weeks gestation of pregnancy (Primary)  -     POC Urinalysis Dipstick, Multipro    2. History of pre-eclampsia in prior pregnancy, currently pregnant    3. Recurrent pregnancy loss    4. Subchorionic hemorrhage in first trimester    5. Urinary frequency  -     Urine Culture - Urine, Urine, Clean Catch    Copied and pasted from [facility] u/s note  Cephalic  Anterior placenta (no evidence of accessory lobe)   grams, 8 oz, 27 %, AC 46%  Amniotic fluid normal - MVP 3.9 cm  Cervical length 41 mm  Fetal anatomy appears normal, Limited fetal spine views - 5-6 week follow up recommended  -----------------------  PLAN:   Return for as scheduled onthe .      ALEAH Street  2025 11:51 EDT

## 2025-05-31 LAB
BACTERIA UR CULT: NO GROWTH
BACTERIA UR CULT: NORMAL

## 2025-06-03 ENCOUNTER — APPOINTMENT (OUTPATIENT)
Dept: PHYSICAL THERAPY | Facility: HOSPITAL | Age: 35
End: 2025-06-03
Payer: COMMERCIAL

## 2025-06-06 ENCOUNTER — HOSPITAL ENCOUNTER (OUTPATIENT)
Dept: PHYSICAL THERAPY | Facility: HOSPITAL | Age: 35
Setting detail: THERAPIES SERIES
Discharge: HOME | End: 2025-06-06
Payer: COMMERCIAL

## 2025-06-06 DIAGNOSIS — R29.898 HIP WEAKNESS: ICD-10-CM

## 2025-06-06 DIAGNOSIS — M25.552 BILATERAL HIP PAIN: Primary | ICD-10-CM

## 2025-06-06 DIAGNOSIS — M25.551 BILATERAL HIP PAIN: Primary | ICD-10-CM

## 2025-06-06 DIAGNOSIS — M54.50 ACUTE MIDLINE LOW BACK PAIN WITHOUT SCIATICA: ICD-10-CM

## 2025-06-06 PROCEDURE — 97140 MANUAL THERAPY 1/> REGIONS: CPT

## 2025-06-06 PROCEDURE — 97110 THERAPEUTIC EXERCISES: CPT

## 2025-06-06 NOTE — THERAPY TREATMENT NOTE
Outpatient Physical Therapy Ortho Treatment Note  [facility]     Patient Name: Stephanie Durham  : 1990  MRN: [id number]  Today's Date: 2025      Visit Date: 2025    Visit Dx:    ICD-10-CM ICD-9-CM   1. Bilateral hip pain  M25.551 719.45    M25.552    2. Acute midline low back pain without sciatica  M54.50 724.2   3. Hip weakness  R29.898 729.89       Patient Active Problem List   Diagnosis    Galactorrhea not associated with childbirth    Nipple discharge    Abnormal finding on breast imaging    Family history of breast cancer    Breast pain    History of preeclampsia    Recurrent pregnancy loss    Subchorionic hemorrhage in first trimester    Breast pain, left    Placenta previa antepartum    History of pre-eclampsia in prior pregnancy, currently pregnant        Past Medical History:   Diagnosis Date    Anemia     Anxiety     panic attacks    Breast lump     on left- followed by Malka Dudley next appointment in 2025    Female infertility 2020    Gestational hypertension 2017    Hypertension     preeclapmsia with pregnancy    Migraine 2008    occular migraines    Ovarian cyst 2015    Preeclampsia 2017    2017 and 2019    Recurrent pregnancy loss, antepartum condition or complication 2016    3 miscarriages    Urinary tract infection     Visual impairment     corneal abrasions        Past Surgical History:   Procedure Laterality Date    BREAST SURGERY Left 2017    Left Breast Biopsy    WISDOM TOOTH EXTRACTION                          PT Assessment/Plan       Row Name 25 1100          PT Assessment    Assessment Comments Jack returns to PT continuing to report ongoing improvements in overall pain. Does report less adductor pain, only if she has a loss of balance, most pain at this time is isolated to L lateral hip. Continued with gentle motion as well as further progressed strength with inclusion of bridges with great tolerance. She continues to to have significant tension  throughout body with exercises, copious cueing for breath work and relaxation with improved tolerance following. Resumed manual interventions, she is fatigued at termination however denies any increase in pain. Updated HEP, discussed return to gentle exercise, she verbalizes understanding. Will continue to progress as appropriate.  -MO        PT Plan    PT Plan Comments tolerance to last session? add reverse to clamshell, consider seated add and abd with PPT  -MO               User Key  (r) = Recorded By, (t) = Taken By, (c) = Cosigned By      Initials Name Provider Type    Lou Cervantes, PT Physical Therapist                       OP Exercises       Row Name 06/06/25 0900             Subjective    Subjective Comments Only have the inside of the thigh pain if I slip or it catches  -MO         Total Minutes    00106 - PT Therapeutic Exercise Minutes 32  -MO      08667 - PT Manual Therapy Minutes 12  -MO         Exercise 1    Exercise Name 1 LTR  -MO      Cueing 1 Verbal  -MO      Reps 1 10ea  -MO      Time 1 small ROM  -MO         Exercise 3    Exercise Name 3 HL hip AB with PPT  -MO      Cueing 3 Verbal  -MO      Reps 3 10  -MO      Time 3 GTB  -MO      Additional Comments cueing to push into stabilization foot to limit adductors  -MO         Exercise 4    Exercise Name 4 sl clam small ROM  -MO      Cueing 4 Verbal  -MO      Sets 4 2B  -MO      Reps 4 10  -MO      Time 4 cueing for relaxation of lower leg- improved sx following  -MO         Exercise 6    Exercise Name 6 seated ball roll  -MO      Cueing 6 Verbal  -MO      Time 6 8 front, 5L  -MO         Exercise 9    Exercise Name 9 PPT +bridge + adduction squeeze  -MO      Cueing 9 Verbal  -MO      Sets 9 2  -MO      Reps 9 12  -MO         Exercise 10    Exercise Name 10 seated PPT  -MO      Cueing 10 Verbal  -MO      Sets 10 1  -MO      Reps 10 15  -MO      Time 10 3s  -MO         Exercise 11    Exercise Name 11 seated PPT + scap retraction  -MO      Cueing 11  Verbal  -MO      Sets 11 2  -MO      Reps 11 15  -MO                User Key  (r) = Recorded By, (t) = Taken By, (c) = Cosigned By      Initials Name Provider Type    Lou Cervantes PT Physical Therapist                             Manual Rx (Last 36 Hours)       Manual Treatments       Row Name 06/06/25 1100 06/06/25 0900          Total Minutes    39266 - PT Manual Therapy Minutes -- 12  -MO        Manual Rx 1    Manual Rx 1 Location STM R hip AD, piriformis  -MO --               User Key  (r) = Recorded By, (t) = Taken By, (c) = Cosigned By      Initials Name Provider Type    Lou Cervantes PT Physical Therapist                                       Time Calculation:   Start Time: 0915  Stop Time: 0959  Time Calculation (min): 44 min  Timed Charges  51380 - PT Therapeutic Exercise Minutes: 32  47334 - PT Manual Therapy Minutes: 12  Total Minutes  Timed Charges Total Minutes: 44   Total Minutes: 44  Therapy Charges for Today       Code Description Service Date Service Provider Modifiers Qty    17720583867 HC PT THER PROC EA 15 MIN 6/6/2025 Lou Perry PT GP 2    58502491148 HC PT MANUAL THERAPY EA 15 MIN 6/6/2025 Lou Perry PT GP 1                      Lou Perry PT  6/6/2025

## 2025-06-09 ENCOUNTER — APPOINTMENT (OUTPATIENT)
Dept: PHYSICAL THERAPY | Facility: HOSPITAL | Age: 35
End: 2025-06-09
Payer: COMMERCIAL

## 2025-06-12 ENCOUNTER — HOSPITAL ENCOUNTER (OUTPATIENT)
Dept: PHYSICAL THERAPY | Facility: HOSPITAL | Age: 35
Setting detail: THERAPIES SERIES
Discharge: HOME | End: 2025-06-12
Payer: COMMERCIAL

## 2025-06-12 DIAGNOSIS — M25.551 BILATERAL HIP PAIN: Primary | ICD-10-CM

## 2025-06-12 DIAGNOSIS — M54.50 ACUTE MIDLINE LOW BACK PAIN WITHOUT SCIATICA: ICD-10-CM

## 2025-06-12 DIAGNOSIS — R29.898 HIP WEAKNESS: ICD-10-CM

## 2025-06-12 DIAGNOSIS — M25.552 BILATERAL HIP PAIN: Primary | ICD-10-CM

## 2025-06-12 PROCEDURE — 97140 MANUAL THERAPY 1/> REGIONS: CPT

## 2025-06-12 PROCEDURE — 97110 THERAPEUTIC EXERCISES: CPT

## 2025-06-12 NOTE — THERAPY TREATMENT NOTE
Outpatient Physical Therapy Ortho Treatment Note  [facility]     Patient Name: Stephanie Durham  : 1990  MRN: [id number]  Today's Date: 2025      Visit Date: 2025    Visit Dx:    ICD-10-CM ICD-9-CM   1. Bilateral hip pain  M25.551 719.45    M25.552    2. Acute midline low back pain without sciatica  M54.50 724.2   3. Hip weakness  R29.898 729.89       Patient Active Problem List   Diagnosis    Galactorrhea not associated with childbirth    Nipple discharge    Abnormal finding on breast imaging    Family history of breast cancer    Breast pain    History of preeclampsia    Recurrent pregnancy loss    Subchorionic hemorrhage in first trimester    Breast pain, left    Placenta previa antepartum    History of pre-eclampsia in prior pregnancy, currently pregnant        Past Medical History:   Diagnosis Date    Anemia     Anxiety     panic attacks    Breast lump     on left- followed by Malka Dudley next appointment in 2025    Female infertility 2020    Gestational hypertension 2017    Hypertension     preeclapmsia with pregnancy    Migraine 2008    occular migraines    Ovarian cyst 2015    Preeclampsia 2017    2017 and 2019    Recurrent pregnancy loss, antepartum condition or complication 2016    3 miscarriages    Urinary tract infection     Visual impairment     corneal abrasions        Past Surgical History:   Procedure Laterality Date    BREAST SURGERY Left 2017    Left Breast Biopsy    WISDOM TOOTH EXTRACTION                          PT Assessment/Plan       Row Name 25 1000          PT Assessment    Assessment Comments Jack reports inc pain this date which is likely related to increased sitting while driving. Pain is localized to pubic symphysis region therefore reviewed body mechanics and pain management techniques, pt receptive. Initiated lumbar distract and R LE LAD with good tolerance. Updated HEP and reviewed with pt who reports understanding and remains appropriate for PT.   -RS        PT Plan    PT Plan Comments dec mat, perfom AR, STS with exhale  -RS               User Key  (r) = Recorded By, (t) = Taken By, (c) = Cosigned By      Initials Name Provider Type    RS Venice Connor PT Physical Therapist                       OP Exercises       Row Name 06/12/25 1000             Subjective    Subjective Comments pt reports inc pain the past couple of days which she reports is likely related to baby's positioning and prolonged driving  -RS         Subjective Pain    Able to rate subjective pain? yes  -RS      Pre-Treatment Pain Level 5  -RS         Total Minutes    94141 - PT Therapeutic Exercise Minutes 27  -RS      13080 - PT Manual Therapy Minutes 15  -RS         Exercise 1    Exercise Name 1 LTR  -RS      Cueing 1 Verbal  -RS      Reps 1 10ea  -RS      Time 1 small ROM  -RS         Exercise 2    Exercise Name 2 thoracic rotation  -RS      Cueing 2 Verbal;Demo  -RS      Reps 2 10  -RS         Exercise 3    Exercise Name 3 HL hip AB with PPT  -RS      Cueing 3 Verbal  -RS      Reps 3 10  -RS      Time 3 GTB  -RS         Exercise 4    Exercise Name 4 sl clam small ROM  -RS      Cueing 4 Verbal  -RS      Sets 4 2B  -RS      Reps 4 10  -RS      Time 4 cueing for relaxation of lower leg- improved sx following  -RS         Exercise 6    Exercise Name 6 seated ball roll  -RS      Cueing 6 Verbal  -RS      Time 6 8 front, 5L  -RS         Exercise 9    Exercise Name 9 PPT +bridge + adduction squeeze  -RS      Cueing 9 Verbal  -RS      Sets 9 2  -RS      Reps 9 12  -RS         Exercise 10    Exercise Name 10 seated PPT  -RS      Cueing 10 Verbal  -RS      Sets 10 1  -RS      Reps 10 15  -RS      Time 10 3s  -RS         Exercise 11    Exercise Name 11 seated PPT + scap retraction  -RS      Cueing 11 Verbal  -RS      Sets 11 2  -RS      Reps 11 15  -RS                User Key  (r) = Recorded By, (t) = Taken By, (c) = Cosigned By      Initials Name Provider Type    RS Venice Connor PT  Physical Therapist                             Manual Rx (Last 36 Hours)       Manual Treatments       Row Name 06/12/25 1000             Total Minutes    91036 - PT Manual Therapy Minutes 15  -RS         Manual Rx 1    Manual Rx 1 Location STM R hip AD, piriformis  -RS         Manual Rx 2    Manual Rx 2 Location lumbar distract belt  -RS         Manual Rx 3    Manual Rx 3 Location R hip LAD  -RS                User Key  (r) = Recorded By, (t) = Taken By, (c) = Cosigned By      Initials Name Provider Type    RS Venice Connor, PT Physical Therapist                     PT OP Goals       Row Name 06/12/25 1000          PT Short Term Goals    STG Date to Achieve 06/18/25  -RS     STG 1 Patient will be independent with education for symptom management and initial HEP  -RS     STG 1 Progress Met  -RS     STG 2 Pt will demonstrate log roll techniques without cueing when getting on<>off mat table to reduce abdominal and low back strain.  -RS     STG 2 Progress Partially Met  -RS     STG 3 Pt will demo appropriate core activation during functional tasks with minimal verbal cueing for long term managment of condition.  -RS     STG 3 Progress Ongoing  -RS     STG 4 Pt will tolerate 20-30 minutes of walking or standing activity with minimal increase in hip pain for improved tolerance to recreational activity.  -RS     STG 4 Progress Ongoing  -RS        Long Term Goals    LTG Date to Achieve 07/18/25  -RS     LTG 1 Patient will be independent with education for symptom management and advanced HEP  -RS     LTG 1 Progress Ongoing  -RS     LTG 2 Pt will improve BL hip ext and abd strength to >/= 4+/5 to support lumbopelvic stability throughout pregnancy.  -RS     LTG 2 Progress Ongoing  -RS     LTG 3 Pt will subjectively report 50% improvement in R hip pain symptoms for return to recreational activities.  -RS     LTG 3 Progress Ongoing  -RS     LTG 4 Pt will verbalize understanding and demo proper body mechanics with sit to  stand, stair navigation, and recreational activities without onset of pain.  -RS     LTG 4 Progress Ongoing  -RS               User Key  (r) = Recorded By, (t) = Taken By, (c) = Cosigned By      Initials Name Provider Type    RS Venice Connor PT Physical Therapist                    Therapy Education  Given: HEP  Program: Reinforced, Progressed  How Provided: Verbal, Demonstration, Written  Provided to: Patient  Level of Understanding: Verbalized, Demonstrated              Time Calculation:   Start Time: 1002  Stop Time: 1047  Time Calculation (min): 45 min  Timed Charges  20435 - PT Therapeutic Exercise Minutes: 27  30190 - PT Manual Therapy Minutes: 15  Total Minutes  Timed Charges Total Minutes: 42   Total Minutes: 42  Therapy Charges for Today       Code Description Service Date Service Provider Modifiers Qty    00800851768  PT THER PROC EA 15 MIN 6/12/2025 Venice Connor, PT GP 2    [id number]  PT MANUAL THERAPY EA 15 MIN 6/12/2025 Venice Connor, PT GP 1                      Venice Connor PT  6/12/2025

## 2025-06-16 ENCOUNTER — APPOINTMENT (OUTPATIENT)
Dept: PHYSICAL THERAPY | Facility: HOSPITAL | Age: 35
End: 2025-06-16
Payer: COMMERCIAL

## 2025-06-17 ENCOUNTER — ROUTINE PRENATAL (OUTPATIENT)
Dept: OBSTETRICS AND GYNECOLOGY | Age: 35
End: 2025-06-17
Payer: COMMERCIAL

## 2025-06-17 VITALS — DIASTOLIC BLOOD PRESSURE: 76 MMHG | WEIGHT: 132 LBS | SYSTOLIC BLOOD PRESSURE: 116 MMHG | BODY MASS INDEX: 19.48 KG/M2

## 2025-06-17 DIAGNOSIS — Z3A.21 21 WEEKS GESTATION OF PREGNANCY: Primary | ICD-10-CM

## 2025-06-17 DIAGNOSIS — Z13.89 SCREENING FOR HEMATURIA OR PROTEINURIA: ICD-10-CM

## 2025-06-17 DIAGNOSIS — O09.299 HISTORY OF PRE-ECLAMPSIA IN PRIOR PREGNANCY, CURRENTLY PREGNANT: ICD-10-CM

## 2025-06-17 PROBLEM — O44.00 PLACENTA PREVIA ANTEPARTUM: Status: RESOLVED | Noted: 2025-05-13 | Resolved: 2025-06-17

## 2025-06-17 PROBLEM — O14.90 PRE-ECLAMPSIA, ANTEPARTUM: Status: RESOLVED | Noted: 2025-03-10 | Resolved: 2025-06-17

## 2025-06-17 LAB
CLARITY, POC: CLEAR
COLOR UR: YELLOW
GLUCOSE UR STRIP-MCNC: NEGATIVE MG/DL
PROT UR STRIP-MCNC: NEGATIVE MG/DL

## 2025-06-17 NOTE — PROGRESS NOTES
Chief Complaint   Patient presents with    Routine Prenatal Visit     21 Week's, 4 Day's. Patient has no concerns.         HPI  Stephanie Durham is a 35 y.o. female presents for scheduled visit. She denies reg ctx, vag bleeding/leaking fluid. She has occ mild headaches but nothing severe. She is working with PT and it helps a little with her pelvic pain.        The following portions of the patient's history were reviewed and updated as appropriate: allergies, current medications, past family history, past medical history, past social history, past surgical history, and problem list.    Review of Systems  Musculoskeletal: pos for hip pain.   : neg for reg ctx, vag bleeding/leaking fluid    /76   Wt 59.9 kg (132 lb)   LMP 01/17/2025   BMI 19.48 kg/m²         Physical Exam  Constitutional:       Appearance: Normal appearance.   Neurological:      Mental Status: She is alert.   Psychiatric:         Mood and Affect: Mood normal.         Behavior: Behavior normal.     FH= 22 cm  FHT's 148 bpm  Ext: no edema or cords in bilateral lower ext        Diagnoses and all orders for this visit:    1. 21 weeks gestation of pregnancy (Primary)  -     POC Urinalysis Dipstick    2. Screening for hematuria or proteinuria  -     POC Urinalysis Dipstick    3. History of pre-eclampsia in prior pregnancy, currently pregnant      21 wks: anatomy u/s with MFM was stable. She has f/u scheduled there in July. Reviewed PTL and preeclamptic warnings.     Hx preeclampsia: on baby asa, growth surveillance planned

## 2025-06-19 ENCOUNTER — APPOINTMENT (OUTPATIENT)
Dept: PHYSICAL THERAPY | Facility: HOSPITAL | Age: 35
End: 2025-06-19
Payer: COMMERCIAL

## 2025-06-23 ENCOUNTER — APPOINTMENT (OUTPATIENT)
Dept: PHYSICAL THERAPY | Facility: HOSPITAL | Age: 35
End: 2025-06-23
Payer: COMMERCIAL

## 2025-06-25 ENCOUNTER — HOSPITAL ENCOUNTER (OUTPATIENT)
Dept: PHYSICAL THERAPY | Facility: HOSPITAL | Age: 35
Setting detail: THERAPIES SERIES
Discharge: HOME | End: 2025-06-25
Payer: COMMERCIAL

## 2025-06-25 DIAGNOSIS — M25.552 BILATERAL HIP PAIN: Primary | ICD-10-CM

## 2025-06-25 DIAGNOSIS — M25.551 BILATERAL HIP PAIN: Primary | ICD-10-CM

## 2025-06-25 DIAGNOSIS — M54.50 ACUTE MIDLINE LOW BACK PAIN WITHOUT SCIATICA: ICD-10-CM

## 2025-06-25 DIAGNOSIS — R29.898 HIP WEAKNESS: ICD-10-CM

## 2025-06-25 PROCEDURE — 97140 MANUAL THERAPY 1/> REGIONS: CPT

## 2025-06-25 PROCEDURE — 97110 THERAPEUTIC EXERCISES: CPT

## 2025-06-26 NOTE — THERAPY PROGRESS REPORT/RE-CERT
Outpatient Physical Therapy Ortho Progress Note  [facility]     Patient Name: Stephanie Durham  : 1990  MRN: [id number]  Today's Date: 2025      Visit Date: 2025    Visit Dx:    ICD-10-CM ICD-9-CM   1. Bilateral hip pain  M25.551 719.45    M25.552    2. Acute midline low back pain without sciatica  M54.50 724.2   3. Hip weakness  R29.898 729.89       Patient Active Problem List   Diagnosis    Galactorrhea not associated with childbirth    Nipple discharge    Abnormal finding on breast imaging    Family history of breast cancer    Breast pain    Recurrent pregnancy loss    Subchorionic hemorrhage in first trimester    Breast pain, left    History of pre-eclampsia in prior pregnancy, currently pregnant        Past Medical History:   Diagnosis Date    Anemia     Anxiety     panic attacks    Breast lump     on left- followed by Malka Dudley next appointment in 2025    Female infertility 2020    Gestational hypertension 2017    Hypertension     preeclapmsia with pregnancy    Migraine 2008    occular migraines    Ovarian cyst 2015    Preeclampsia 2017    2017 and 2019    Recurrent pregnancy loss, antepartum condition or complication 2016    3 miscarriages    Urinary tract infection     Visual impairment     corneal abrasions        Past Surgical History:   Procedure Laterality Date    BREAST SURGERY Left 2017    Left Breast Biopsy    WISDOM TOOTH EXTRACTION                          PT Assessment/Plan       Row Name 25 1700          PT Assessment    Assessment Comments Stephanie Durham has been seen for 6 physical therapy sessions for BL hip and LBP related to pregnancy.  Treatment has included therapeutic exercise, manual therapy, and patient education with home exercise program . Progress to physical therapy goals is fair. Pt has met 3.5/4 STG and 1/4 LTG. Progress limited secondary to ongoing changes related to pregnancy. She has overall improved frequency in pain however does  continue to have significant flareups in pain following prolonged sitting. She has had 2 sessions arriving with significant pain but having near full resolution in symptoms at session termination. She is able to demo appropriate core activation during functional movements and is mindful of body positioning throughout the day. She does continue to demo poor lumbopelvic stability as well as generalized tension in which heightens nervous system response. She returns today with high pain, utilized manual interventions including LAD and STM to hip flexors, as well as added several gentle ROM and mobility exercises for full resolution in pain. Spent additional time discussing techniques for pain management should symptoms return in the future, she verbalizes understanding. She will benefit from continued skilled physical therapy to address remaining impairments and functional limitations.  -MO        PT Plan    PT Plan Comments STS w/ exhale. How was trip?  -MO               User Key  (r) = Recorded By, (t) = Taken By, (c) = Cosigned By      Initials Name Provider Type    Lou Cervantes, PT Physical Therapist                                   Manual Rx (Last 36 Hours)       Manual Treatments       Row Name 06/25/25 1300 06/25/25 1200          Total Minutes    69417 - PT Manual Therapy Minutes -- 15  -MO        Manual Rx 1    Manual Rx 1 Type STM R hip flexor  -MO --               User Key  (r) = Recorded By, (t) = Taken By, (c) = Cosigned By      Initials Name Provider Type    Lou Cervantes, PT Physical Therapist                     PT OP Goals       Row Name 06/25/25 1700          PT Short Term Goals    STG Date to Achieve 06/18/25  -MO     STG 1 Patient will be independent with education for symptom management and initial HEP  -MO     STG 1 Progress Met  -MO     STG 2 Pt will demonstrate log roll techniques without cueing when getting on<>off mat table to reduce abdominal and low back strain.  -MO     STG 2 Progress  Met  -MO     STG 3 Pt will demo appropriate core activation during functional tasks with minimal verbal cueing for long term managment of condition.  -MO     STG 3 Progress Met  -MO     STG 4 Pt will tolerate 20-30 minutes of walking or standing activity with minimal increase in hip pain for improved tolerance to recreational activity.  -MO     STG 4 Progress Progressing;Partially Met  -MO        Long Term Goals    LTG Date to Achieve 07/18/25  -MO     LTG 1 Patient will be independent with education for symptom management and advanced HEP  -MO     LTG 1 Progress Met  -MO     LTG 2 Pt will improve BL hip ext and abd strength to >/= 4+/5 to support lumbopelvic stability throughout pregnancy.  -MO     LTG 2 Progress Ongoing  -MO     LTG 3 Pt will subjectively report 50% improvement in R hip pain symptoms for return to recreational activities.  -MO     LTG 3 Progress Ongoing  -MO     LTG 4 Pt will verbalize understanding and demo proper body mechanics with sit to stand, stair navigation, and recreational activities without onset of pain.  -MO     LTG 4 Progress Progressing  -MO               User Key  (r) = Recorded By, (t) = Taken By, (c) = Cosigned By      Initials Name Provider Type    Lou Cervantes PT Physical Therapist                    Therapy Education  Education Details: Reviewed HEP, discussed tools for pain management when hip pain is elevated              Time Calculation:   Start Time: 1230  Stop Time: 1315  Time Calculation (min): 45 min  Timed Charges  05977 - PT Therapeutic Exercise Minutes: 30  28281 - PT Manual Therapy Minutes: 15  Total Minutes  Timed Charges Total Minutes: 45   Total Minutes: 45  Therapy Charges for Today       Code Description Service Date Service Provider Modifiers Qty    63937592237 HC PT THER PROC EA 15 MIN 6/25/2025 Lou Perry PT GP 2    72511177580 HC PT MANUAL THERAPY EA 15 MIN 6/25/2025 Lou Perry PT GP 1                      Lou Perry PT  6/26/2025

## 2025-06-30 ENCOUNTER — APPOINTMENT (OUTPATIENT)
Dept: PHYSICAL THERAPY | Facility: HOSPITAL | Age: 35
End: 2025-06-30
Payer: COMMERCIAL

## 2025-07-02 ENCOUNTER — APPOINTMENT (OUTPATIENT)
Dept: PHYSICAL THERAPY | Facility: HOSPITAL | Age: 35
End: 2025-07-02
Payer: COMMERCIAL

## 2025-07-03 ENCOUNTER — APPOINTMENT (OUTPATIENT)
Dept: PHYSICAL THERAPY | Facility: HOSPITAL | Age: 35
End: 2025-07-03
Payer: COMMERCIAL

## 2025-07-07 ENCOUNTER — HOSPITAL ENCOUNTER (OUTPATIENT)
Dept: PHYSICAL THERAPY | Facility: HOSPITAL | Age: 35
Setting detail: THERAPIES SERIES
Discharge: HOME | End: 2025-07-07
Payer: COMMERCIAL

## 2025-07-07 DIAGNOSIS — M54.50 ACUTE MIDLINE LOW BACK PAIN WITHOUT SCIATICA: ICD-10-CM

## 2025-07-07 DIAGNOSIS — R29.898 HIP WEAKNESS: ICD-10-CM

## 2025-07-07 DIAGNOSIS — M25.551 BILATERAL HIP PAIN: Primary | ICD-10-CM

## 2025-07-07 DIAGNOSIS — M25.552 BILATERAL HIP PAIN: Primary | ICD-10-CM

## 2025-07-07 PROCEDURE — 97140 MANUAL THERAPY 1/> REGIONS: CPT

## 2025-07-07 PROCEDURE — 97110 THERAPEUTIC EXERCISES: CPT

## 2025-07-07 NOTE — THERAPY TREATMENT NOTE
Outpatient Physical Therapy Ortho Treatment Note  [facility]     Patient Name: Stephanie Durham  : 1990  MRN: [id number]  Today's Date: 2025      Visit Date: 2025    Visit Dx:    ICD-10-CM ICD-9-CM   1. Bilateral hip pain  M25.551 719.45    M25.552    2. Acute midline low back pain without sciatica  M54.50 724.2   3. Hip weakness  R29.898 729.89       Patient Active Problem List   Diagnosis    Galactorrhea not associated with childbirth    Nipple discharge    Abnormal finding on breast imaging    Family history of breast cancer    Breast pain    Recurrent pregnancy loss    Subchorionic hemorrhage in first trimester    Breast pain, left    History of pre-eclampsia in prior pregnancy, currently pregnant        Past Medical History:   Diagnosis Date    Anemia     Anxiety     panic attacks    Breast lump     on left- followed by Malka Dudley next appointment in 2025    Female infertility 2020    Gestational hypertension 2017    Hypertension     preeclapmsia with pregnancy    Migraine 2008    occular migraines    Ovarian cyst 2015    Preeclampsia 2017    2017 and 2019    Recurrent pregnancy loss, antepartum condition or complication 2016    3 miscarriages    Urinary tract infection     Visual impairment     corneal abrasions        Past Surgical History:   Procedure Laterality Date    BREAST SURGERY Left 2017    Left Breast Biopsy    WISDOM TOOTH EXTRACTION  2008                        PT Assessment/Plan       Row Name 25 1000          PT Assessment    Assessment Comments Jack returns to PT this date reporting good relief following all interventions last session, utilizes home strategies appropriate for pain management. Continued to emphasize initial mobility as well as continued manual interventions with ongoing taught bands throughout hip flexors and adductors, great response to interventions. Added alternating seated Z sit, STS w/ TrA/PF, and progressed to stir the pot. No immediate  adverse effects. Additionally discussed home massage/TPR w/ tennis ball, pt demonstrates and verbalizes understanding. Will continue to progress as appropriate.  -MO        PT Plan    PT Plan Comments tolerance to new exercises? Attempt to decrease mat exercises. attempt resisted lateral walk, add band to STS  -MO               User Key  (r) = Recorded By, (t) = Taken By, (c) = Cosigned By      Initials Name Provider Type    Lou Cervantes, PT Physical Therapist                       OP Exercises       Row Name 07/07/25 0900             Subjective    Subjective Comments Felt great after last session, [place], utilized all the stretches at the end of each day for relief. A little flared up from drive yesterday but overall much better  -MO         Total Minutes    47753 - PT Therapeutic Exercise Minutes 30  -MO      77894 - PT Manual Therapy Minutes 10  -MO         Exercise 1    Exercise Name 1 LTR  -MO      Cueing 1 Verbal  -MO      Reps 1 15e  -MO      Time 1 small ROM  -MO         Exercise 4    Additional Comments attempted but remains painful  -MO         Exercise 5    Exercise Name 5 straight leg IR/ER  -MO      Cueing 5 Verbal  -MO      Sets 5 1  -MO      Reps 5 30  -MO         Exercise 8    Exercise Name 8 hip flex isometric  -MO      Cueing 8 Verbal  -MO      Sets 8 1  -MO      Reps 8 15  -MO      Time 8 3s  -MO         Exercise 9    Exercise Name 9 PPT + bridge  -MO      Cueing 9 Verbal  -MO      Sets 9 1  -MO      Reps 9 15  -MO         Exercise 12    Exercise Name 12 AR press  -MO      Cueing 12 Verbal  -MO      Reps 12 1B  -MO      Time 12 15  -MO      Additional Comments RTB  -MO         Exercise 13    Exercise Name 13 lunge w/ SB  -MO      Cueing 13 Verbal  -MO      Sets 13 1R  -MO      Reps 13 90s  -MO         Exercise 14    Exercise Name 14 hip rotation: Z sit  -MO      Cueing 14 Verbal;Demo  -MO      Sets 14 1B  -MO      Reps 14 15  -MO         Exercise 15    Exercise Name 15 STS +  TrA/PF  -MO      Cueing 15 Verbal;Demo  -MO      Sets 15 1  -MO      Reps 15 15  -MO      Time 15 elevated mat table  -MO                User Key  (r) = Recorded By, (t) = Taken By, (c) = Cosigned By      Initials Name Provider Type    Lou Cervantes, PT Physical Therapist                             Manual Rx (Last 36 Hours)       Manual Treatments       Row Name 07/07/25 0900             Total Minutes    79735 - PT Manual Therapy Minutes 10  -MO         Manual Rx 1    Manual Rx 1 Type STM R hip flexor  -MO      Manual Rx 1 Grade x5 TPR  -MO         Manual Rx 3    Manual Rx 3 Location R hip LAD  -MO                User Key  (r) = Recorded By, (t) = Taken By, (c) = Cosigned By      Initials Name Provider Type    Lou Cervantes, PT Physical Therapist                     PT OP Goals       Row Name 07/07/25 0900          PT Short Term Goals    STG Date to Achieve 06/18/25  -MO     STG 1 Patient will be independent with education for symptom management and initial HEP  -MO     STG 1 Progress Met  -MO     STG 2 Pt will demonstrate log roll techniques without cueing when getting on<>off mat table to reduce abdominal and low back strain.  -MO     STG 2 Progress Met  -MO     STG 3 Pt will demo appropriate core activation during functional tasks with minimal verbal cueing for long term managment of condition.  -MO     STG 3 Progress Met  -MO     STG 4 Pt will tolerate 20-30 minutes of walking or standing activity with minimal increase in hip pain for improved tolerance to recreational activity.  -MO     STG 4 Progress Progressing;Partially Met  -MO        Long Term Goals    LTG Date to Achieve 07/18/25  -MO     LTG 1 Patient will be independent with education for symptom management and advanced HEP  -MO     LTG 1 Progress Met  -MO     LTG 2 Pt will improve BL hip ext and abd strength to >/= 4+/5 to support lumbopelvic stability throughout pregnancy.  -MO     LTG 2 Progress Ongoing  -MO     LTG 3 Pt will subjectively report  50% improvement in R hip pain symptoms for return to recreational activities.  -MO     LTG 3 Progress Ongoing  -MO     LTG 4 Pt will verbalize understanding and demo proper body mechanics with sit to stand, stair navigation, and recreational activities without onset of pain.  -MO     LTG 4 Progress Progressing  -MO               User Key  (r) = Recorded By, (t) = Taken By, (c) = Cosigned By      Initials Name Provider Type    Lou Cervantes PT Physical Therapist                    Therapy Education  Education Details: self STM w/ tennis ball. Discussed updated HEP  Given: HEP, Symptoms/condition management  Program: New, Reinforced  How Provided: Verbal, Demonstration  Provided to: Patient  Level of Understanding: Verbalized, Demonstrated              Time Calculation:   Start Time: 0930  Stop Time: 1010  Time Calculation (min): 40 min  Timed Charges  46996 - PT Therapeutic Exercise Minutes: 30  36659 - PT Manual Therapy Minutes: 10  Total Minutes  Timed Charges Total Minutes: 40   Total Minutes: 40  Therapy Charges for Today       Code Description Service Date Service Provider Modifiers Qty    36262756077  PT THER PROC EA 15 MIN 7/7/2025 Lou Perry PT GP 2    [id number] HC PT MANUAL THERAPY EA 15 MIN 7/7/2025 Lou Perry PT GP 1                      Lou Perry PT  7/7/2025

## 2025-07-08 ENCOUNTER — HOSPITAL ENCOUNTER (OUTPATIENT)
Dept: ULTRASOUND IMAGING | Facility: HOSPITAL | Age: 35
Discharge: HOME | End: 2025-07-08
Admitting: OBSTETRICS & GYNECOLOGY
Payer: COMMERCIAL

## 2025-07-08 ENCOUNTER — OFFICE VISIT (OUTPATIENT)
Dept: OBSTETRICS AND GYNECOLOGY | Facility: CLINIC | Age: 35
End: 2025-07-08
Payer: COMMERCIAL

## 2025-07-08 VITALS
DIASTOLIC BLOOD PRESSURE: 78 MMHG | HEART RATE: 63 BPM | OXYGEN SATURATION: 100 % | BODY MASS INDEX: 20.08 KG/M2 | TEMPERATURE: 98.2 F | SYSTOLIC BLOOD PRESSURE: 117 MMHG | WEIGHT: 135.6 LBS | HEIGHT: 69 IN

## 2025-07-08 DIAGNOSIS — N96 RECURRENT PREGNANCY LOSS: ICD-10-CM

## 2025-07-08 DIAGNOSIS — Z3A.24 24 WEEKS GESTATION OF PREGNANCY: Primary | ICD-10-CM

## 2025-07-08 PROCEDURE — 76816 OB US FOLLOW-UP PER FETUS: CPT

## 2025-07-08 NOTE — PROGRESS NOTES
Pt reports that she is doing well and denies vaginal bleeding, cramping, contractions or LOF at this time. Reports active fetal movement. Reviewed when to call OB office or present to L&D for evaluation with symptoms such as decreased fetal movement, vaginal bleeding, LOF or ctxs. Pt verbalized understanding. Denies HA, visual changes or epigastric pain. Denies any additional complaints at time of appointment. Next OB appointment scheduled for 7/21.     Vitals:    07/08/25 1400   BP: 117/78   Pulse: 63   Temp: 98.2 °F (36.8 °C)   SpO2: 100%

## 2025-07-08 NOTE — LETTER
2025     Radha Spears MD  [address]  [address]  [place] [id number]    Patient: Stephanie Durham   YOB: 1990   Date of Visit: 2025       Dear Radha Spears MD    Stephanie Durham was in my office today. Below is a copy of my note.    If you have questions, please do not hesitate to call me. I look forward to following Stephanie along with you.         Sincerely,        Kelsi Montes MD        CC: No Recipients    Pt reports that she is doing well and denies vaginal bleeding, cramping, contractions or LOF at this time. Reports active fetal movement. Reviewed when to call OB office or present to L&D for evaluation with symptoms such as decreased fetal movement, vaginal bleeding, LOF or ctxs. Pt verbalized understanding. Denies HA, visual changes or epigastric pain. Denies any additional complaints at time of appointment. Next OB appointment scheduled for .     Vitals:    25 1400   BP: 117/78   Pulse: 63   Temp: 98.2 °F (36.8 °C)   SpO2: 100%         MATERNAL FETAL MEDICINE Consult Note    Dear Dr Radha Spears MD:    Thank you for your kind referral of Stephanie Durham.  As you know, she is a 35 y.o.   24w4d gestation (Estimated Date of Delivery: 10/24/25). This is a consult.      Her antepartum course is complicated by:  - Past history of multiple miscarriage  - Past history of pre eclampsia    Aneuploidy Screening: [facility] Low Risk male  Carrier Screening: Declined ... Reports negative in the past    HPI: Today, she feels well. No contractions or cramping. Baby is very active.     Review of History:  Past Medical History:   Diagnosis Date   • Anemia    • Anxiety     panic attacks   • Breast lump     on left- followed by Malka Dudley next appointment in 2025   • Female infertility    • Gestational hypertension    • Hypertension     preeclapmsia with pregnancy   • Migraine 2008    occular migraines   • Ovarian cyst 2015   •  Preeclampsia 2017     and 2019   • Recurrent pregnancy loss, antepartum condition or complication 2016    3 miscarriages   • Urinary tract infection    • Visual impairment     corneal abrasions     Past Surgical History:   Procedure Laterality Date   • BREAST SURGERY Left 2017    Left Breast Biopsy   • WISDOM TOOTH EXTRACTION         OB Hx:  OB History    Para Term  AB Living   7 3 2 1 3 3   SAB IAB Ectopic Molar Multiple Live Births   3     3      # Outcome Date GA Lbr Eamon/2nd Weight Sex Type Anes PTL Lv   7 Current            6 SAB 10/04/24 7w0d    SAB      5 Term 07/10/19 37w5d 07:49 / 00:05 2860 g (6 lb 4.9 oz) F Vag-Spont EPI N CINDY   4 SAB 18 9w0d    SAB      3  17 37w0d  2920 g (6 lb 7 oz) F Vag-Spont   CINDY      Complications: Mild pre-eclampsia   2 SAB 2016 8w0d    SAB      1 Term 10/14/13 40w4d  3147 g (6 lb 15 oz) M Vag-Spont   CINDY       Social History     Socioeconomic History   • Marital status:    Tobacco Use   • Smoking status: Never     Passive exposure: Never   • Smokeless tobacco: Never   Vaping Use   • Vaping status: Never Used   Substance and Sexual Activity   • Alcohol use: Never   • Drug use: Never   • Sexual activity: Not Currently     Partners: Male     Birth control/protection: None     Family History   Problem Relation Age of Onset   • Mental illness Mother    • Arthritis Mother    • Anxiety disorder Mother    • Cancer Father    • Melanoma Father    • Learning disabilities Brother    • Thyroid disease Maternal Grandmother    • Breast cancer Maternal Grandmother 82   • Lung cancer Maternal Grandmother       Allergies   Allergen Reactions   • Adhesive Tape Hives     bandaid brand      Current Outpatient Medications on File Prior to Visit   Medication Sig Dispense Refill   • aspirin 81 MG chewable tablet Chew 1 tablet Daily.     • prenatal vitamin (prenatal, CLASSIC, vitamin) tablet Take  by mouth Daily.       No current facility-administered  "medications on file prior to visit.      Past obstetric, gynecological, medical, surgical, family and social history reviewed.  Relevant lab work and imaging reviewed.    Review of systems  As above in HPI     Vitals:    25 1400   BP: 117/78   BP Location: Left arm   Patient Position: Sitting   Pulse: 63   Temp: 98.2 °F (36.8 °C)   TempSrc: Oral   SpO2: 100%   Weight: 61.5 kg (135 lb 9.6 oz)   Height: 175.3 cm (69\")       PHYSICAL EXAM   General: No acute distress  Respiratory: No increased work of breathing  Cardiac: Reg rate   Abdominal: Gravid, nontender  Extremities: No significant edema  Neuro/psych: Alert and oriented, appropriate mood      LABS:   Lupus labs normal except elevated ACL IgM    ULTRASOUND   Please view full ultrasound note on Imaging tab in ViewPoint.  Cephalic presentation  Anterior placenta, No evidence of accessory lobe   Fetal biometry is consistent with dates EFW 87%, AC 66%  ELKIN 24 cm which is normal  The fetal anatomic survey is now successfully obtained and appears normal.    ASSESSMENT/COUNSELIN y.o.   24w4d gestation (Estimated Date of Delivery: 10/24/25).       -Pregnancy  [ X ] stable  [   ] improving [  ] worsening    Diagnoses and all orders for this visit:    1. 24 weeks gestation of pregnancy (Primary)    2. Recurrent pregnancy loss         DISCUSSION  Recurrent pregnancy loss:   Patient doing well.   Anticardiolipin IgM mildly elevated; of no consequence.    Summary of Plan  - Third trimester growth scans should be done every 4-6 weeks (precaution for ACL IgM). Can be done with primary OB  - Antepartum surveillance according to clinical circumstances.   -Starting at 28 weeks: Fetal movement instructions given continue daily until delivery; instructed to report to labor and delivery if cannot achieve more than 10 kicks in one hour or if she perceives a decrease in fetal movement    Follow-up: No further MFM follow up; happy to see patient again at patient or " provider request      Thank you for the consult and opportunity to care for this patient.  Please feel free to reach out with any questions or concerns.      I spent 10 minutes caring for this patient on this date of service. This time includes time spent by me in the following activities: preparing for the visit, reviewing tests, obtaining and/or reviewing a separately obtained history, performing a medically appropriate examination and/or evaluation, counseling and educating the patient/family/caregiver and independently interpreting results and communicating that information with the patient/family/caregiver with greater than 50% spent in counseling and coordination of care.         I spent 3 minutes on the separately reported service of US imaging not included in the time used to support the E/M service also reported today.      Kelsi Montes MD   Maternal Fetal Medicine-[facility]  Office: [phone]

## 2025-07-08 NOTE — PROGRESS NOTES
MATERNAL FETAL MEDICINE Consult Note    Dear Dr Radha Spears MD:    Thank you for your kind referral of Stephanie Durham.  As you know, she is a 35 y.o.   24w4d gestation (Estimated Date of Delivery: 10/24/25). This is a consult.      Her antepartum course is complicated by:  - Past history of multiple miscarriage  - Past history of pre eclampsia    Aneuploidy Screening: [facility] Low Risk male  Carrier Screening: Declined ... Reports negative in the past    HPI: Today, she feels well. No contractions or cramping. Baby is very active.     Review of History:  Past Medical History:   Diagnosis Date    Anemia     Anxiety     panic attacks    Breast lump     on left- followed by Malka Dudley next appointment in 2025    Female infertility     Gestational hypertension 2017    Hypertension     preeclapmsia with pregnancy    Migraine 2008    occular migraines    Ovarian cyst 2015    Preeclampsia 2017    2017 and 2019    Recurrent pregnancy loss, antepartum condition or complication 2016    3 miscarriages    Urinary tract infection     Visual impairment     corneal abrasions     Past Surgical History:   Procedure Laterality Date    BREAST SURGERY Left 2017    Left Breast Biopsy    WISDOM TOOTH EXTRACTION         OB Hx:  OB History    Para Term  AB Living   7 3 2 1 3 3   SAB IAB Ectopic Molar Multiple Live Births   3     3      # Outcome Date GA Lbr Eamon/2nd Weight Sex Type Anes PTL Lv   7 Current            6 SAB 10/04/24 7w0d    SAB      5 Term 07/10/19 37w5d 07:49 / 00:05 2860 g (6 lb 4.9 oz) F Vag-Spont EPI N CINDY   4 SAB 18 9w0d    SAB      3  17 37w0d  2920 g (6 lb 7 oz) F Vag-Spont   CINDY      Complications: Mild pre-eclampsia   2 SAB 2016 8w0d    SAB      1 Term 10/14/13 40w4d  3147 g (6 lb 15 oz) M Vag-Spont   CINDY       Social History     Socioeconomic History    Marital status:    Tobacco Use    Smoking status: Never     Passive exposure: Never     "Smokeless tobacco: Never   Vaping Use    Vaping status: Never Used   Substance and Sexual Activity    Alcohol use: Never    Drug use: Never    Sexual activity: Not Currently     Partners: Male     Birth control/protection: None     Family History   Problem Relation Age of Onset    Mental illness Mother     Arthritis Mother     Anxiety disorder Mother     Cancer Father     Melanoma Father     Learning disabilities Brother     Thyroid disease Maternal Grandmother     Breast cancer Maternal Grandmother 82    Lung cancer Maternal Grandmother       Allergies   Allergen Reactions    Adhesive Tape Hives     bandaid brand      Current Outpatient Medications on File Prior to Visit   Medication Sig Dispense Refill    aspirin 81 MG chewable tablet Chew 1 tablet Daily.      prenatal vitamin (prenatal, CLASSIC, vitamin) tablet Take  by mouth Daily.       No current facility-administered medications on file prior to visit.      Past obstetric, gynecological, medical, surgical, family and social history reviewed.  Relevant lab work and imaging reviewed.    Review of systems  As above in HPI     Vitals:    25 1400   BP: 117/78   BP Location: Left arm   Patient Position: Sitting   Pulse: 63   Temp: 98.2 °F (36.8 °C)   TempSrc: Oral   SpO2: 100%   Weight: 61.5 kg (135 lb 9.6 oz)   Height: 175.3 cm (69\")       PHYSICAL EXAM   General: No acute distress  Respiratory: No increased work of breathing  Cardiac: Reg rate   Abdominal: Gravid, nontender  Extremities: No significant edema  Neuro/psych: Alert and oriented, appropriate mood      LABS:   Lupus labs normal except elevated ACL IgM    ULTRASOUND   Please view full ultrasound note on Imaging tab in ViewPoint.  Cephalic presentation  Anterior placenta, No evidence of accessory lobe   Fetal biometry is consistent with dates EFW 87%, AC 66%  ELKIN 24 cm which is normal  The fetal anatomic survey is now successfully obtained and appears normal.    ASSESSMENT/COUNSELIN y.o. "   24w4d gestation (Estimated Date of Delivery: 10/24/25).       -Pregnancy  [ X ] stable  [   ] improving [  ] worsening    Diagnoses and all orders for this visit:    1. 24 weeks gestation of pregnancy (Primary)    2. Recurrent pregnancy loss         DISCUSSION  Recurrent pregnancy loss:   Patient doing well.   Anticardiolipin IgM mildly elevated; of no consequence.    Summary of Plan  - Third trimester growth scans should be done every 4-6 weeks (precaution for ACL IgM). Can be done with primary OB  - Antepartum surveillance according to clinical circumstances.   -Starting at 28 weeks: Fetal movement instructions given continue daily until delivery; instructed to report to labor and delivery if cannot achieve more than 10 kicks in one hour or if she perceives a decrease in fetal movement    Follow-up: No further MFM follow up; happy to see patient again at patient or provider request      Thank you for the consult and opportunity to care for this patient.  Please feel free to reach out with any questions or concerns.      I spent 10 minutes caring for this patient on this date of service. This time includes time spent by me in the following activities: preparing for the visit, reviewing tests, obtaining and/or reviewing a separately obtained history, performing a medically appropriate examination and/or evaluation, counseling and educating the patient/family/caregiver and independently interpreting results and communicating that information with the patient/family/caregiver with greater than 50% spent in counseling and coordination of care.         I spent 3 minutes on the separately reported service of US imaging not included in the time used to support the E/M service also reported today.      Kelsi Montes MD   Maternal Fetal Medicine-[facility]  Office: [phone]

## 2025-07-18 ENCOUNTER — HOSPITAL ENCOUNTER (OUTPATIENT)
Dept: PHYSICAL THERAPY | Facility: HOSPITAL | Age: 35
Setting detail: THERAPIES SERIES
Discharge: HOME | End: 2025-07-18
Payer: COMMERCIAL

## 2025-07-18 DIAGNOSIS — M54.50 ACUTE MIDLINE LOW BACK PAIN WITHOUT SCIATICA: ICD-10-CM

## 2025-07-18 DIAGNOSIS — R29.898 HIP WEAKNESS: ICD-10-CM

## 2025-07-18 DIAGNOSIS — M25.552 BILATERAL HIP PAIN: Primary | ICD-10-CM

## 2025-07-18 DIAGNOSIS — M25.551 BILATERAL HIP PAIN: Primary | ICD-10-CM

## 2025-07-18 PROBLEM — R92.8 ABNORMAL FINDING ON BREAST IMAGING: Status: RESOLVED | Noted: 2022-11-18 | Resolved: 2025-07-18

## 2025-07-18 PROCEDURE — 97140 MANUAL THERAPY 1/> REGIONS: CPT

## 2025-07-18 PROCEDURE — 97110 THERAPEUTIC EXERCISES: CPT

## 2025-07-18 NOTE — THERAPY TREATMENT NOTE
Outpatient Physical Therapy Ortho Treatment Note  [facility]     Patient Name: Stephanie Durham  : 1990  MRN: [id number]  Today's Date: 2025      Visit Date: 2025    Visit Dx:    ICD-10-CM ICD-9-CM   1. Bilateral hip pain  M25.551 719.45    M25.552    2. Acute midline low back pain without sciatica  M54.50 724.2   3. Hip weakness  R29.898 729.89       Patient Active Problem List   Diagnosis    Galactorrhea not associated with childbirth    Nipple discharge    Abnormal finding on breast imaging    Family history of breast cancer    Breast pain    Recurrent pregnancy loss    Subchorionic hemorrhage in first trimester    Breast pain, left    History of pre-eclampsia in prior pregnancy, currently pregnant        Past Medical History:   Diagnosis Date    Anemia     Anxiety     panic attacks    Breast lump     on left- followed by Malka Dudley next appointment in 2025    Female infertility 2020    Gestational hypertension 2017    Hypertension     preeclapmsia with pregnancy    Migraine 2008    occular migraines    Ovarian cyst 2015    Preeclampsia 2017    2017 and 2019    Recurrent pregnancy loss, antepartum condition or complication 2016    3 miscarriages    Urinary tract infection     Visual impairment     corneal abrasions        Past Surgical History:   Procedure Laterality Date    BREAST SURGERY Left 2017    Left Breast Biopsy    WISDOM TOOTH EXTRACTION                          PT Assessment/Plan       Row Name 25 1200          PT Assessment    Assessment Comments The patient reports recent increase in pain in the right hip flexor, adductor region with no known cause.  Noted tenderness to palpation and increase in muscle tone in right iliopsoas, rectus femoris and abductor muscles therefore performed manual therapy focused on improved soft tissue mobility with good tolerance.  Patient experienced short bout of lightheadedness after being on her back in hook lying position,  improved immediately with left side-lying position.  Education provided regarding positioning at home, and will perform manual therapy in reclined position in the future.  Performed supine hip flexor stretching, seated adductor stretching, standing hip flexor stretching, and seated pelvic tilts all with good tolerance.  Patient demonstrates improved gait pattern at end of session compared to start of session, with decreased antalgia noted.  She follows up with her OB next week and reports intermittent headaches with no elevation in blood pressure, encouraged patient to continue to monitor blood pressure and notify OB of any changes.  -RS        PT Plan    PT Plan Comments Resume stability activities as able, follow-up regarding OB appointment, keep in reclined position, transition to standing as able  -RS               User Key  (r) = Recorded By, (t) = Taken By, (c) = Cosigned By      Initials Name Provider Type    RS Venice Connor, PT Physical Therapist                       OP Exercises       Row Name 07/18/25 1200             Subjective    Subjective Comments Patient reports she was previously doing well however in the past couple days has had a flare in her previous exercises were not successful at improving the pain  -RS         Total Minutes    10679 - PT Therapeutic Exercise Minutes 23  -RS      42937 - PT Manual Therapy Minutes 17  -RS         Exercise 2    Exercise Name 2 Supine hip flexor stretch with foot supported on floor  -RS      Reps 2 1 minute each  -RS         Exercise 3    Exercise Name 3 Side-lying clamshell  -RS      Reps 3 8  -RS      Additional Comments Small range of motion (in side-lying as patient was feeling lightheaded in supine position)  -RS         Exercise 4    Exercise Name 4 Standing hip flexor stretch 1 foot on stair  -RS      Reps 4 10  -RS      Time 4 5  -RS         Exercise 5    Exercise Name 5 Seated pelvic tilt  -RS      Reps 5 10 each way  -RS         Exercise 6     Exercise Name 6 Seated adductor stretch  -RS      Reps 6 1 minute each  -RS                User Key  (r) = Recorded By, (t) = Taken By, (c) = Cosigned By      Initials Name Provider Type    RS Venice Connor, PT Physical Therapist                             Manual Rx (Last 36 Hours)       Manual Treatments       Row Name 07/18/25 1200             Total Minutes    34459 - PT Manual Therapy Minutes 17  -RS         Manual Rx 1    Manual Rx 1 Type STM R hip flexor, AADD, rec fem  -RS         Manual Rx 3    Manual Rx 3 Location R hip LAD  -RS                User Key  (r) = Recorded By, (t) = Taken By, (c) = Cosigned By      Initials Name Provider Type    RS Venice Connor, PT Physical Therapist                     PT OP Goals       Row Name 07/18/25 1200          PT Short Term Goals    STG Date to Achieve 06/18/25  -RS     STG 1 Patient will be independent with education for symptom management and initial HEP  -RS     STG 1 Progress Met  -RS     STG 2 Pt will demonstrate log roll techniques without cueing when getting on<>off mat table to reduce abdominal and low back strain.  -RS     STG 2 Progress Met  -RS     STG 3 Pt will demo appropriate core activation during functional tasks with minimal verbal cueing for long term managment of condition.  -RS     STG 3 Progress Met  -RS     STG 4 Pt will tolerate 20-30 minutes of walking or standing activity with minimal increase in hip pain for improved tolerance to recreational activity.  -RS     STG 4 Progress Progressing;Partially Met  -RS        Long Term Goals    LTG Date to Achieve 07/18/25  -RS     LTG 1 Patient will be independent with education for symptom management and advanced HEP  -RS     LTG 1 Progress Met  -RS     LTG 2 Pt will improve BL hip ext and abd strength to >/= 4+/5 to support lumbopelvic stability throughout pregnancy.  -RS     LTG 2 Progress Ongoing  -RS     LTG 3 Pt will subjectively report 50% improvement in R hip pain symptoms for return to  recreational activities.  -RS     LTG 3 Progress Ongoing  -RS     LTG 4 Pt will verbalize understanding and demo proper body mechanics with sit to stand, stair navigation, and recreational activities without onset of pain.  -RS     LTG 4 Progress Progressing  -RS               User Key  (r) = Recorded By, (t) = Taken By, (c) = Cosigned By      Initials Name Provider Type    RS Venice Connor, PT Physical Therapist                    Therapy Education  Given: HEP  Program: Reinforced  How Provided: Verbal, Demonstration  Provided to: Patient  Level of Understanding: Verbalized, Demonstrated              Time Calculation:   Start Time: 1130  Stop Time: 1214  Time Calculation (min): 44 min  Timed Charges  79478 - PT Therapeutic Exercise Minutes: 23  75686 - PT Manual Therapy Minutes: 17  Total Minutes  Timed Charges Total Minutes: 40   Total Minutes: 40  Therapy Charges for Today       Code Description Service Date Service Provider Modifiers Qty    32308905435  PT THER PROC EA 15 MIN 7/18/2025 Venice Connor, PT GP 2    43042247915 HC PT MANUAL THERAPY EA 15 MIN 7/18/2025 Venice Connor, PT GP 1                      Venice Connor PT  7/18/2025

## 2025-07-18 NOTE — PROGRESS NOTES
"[facility]     Referring Provider: Radha Spears MD     Chief complaint: left axillary swelling and breast mass     HPI:   2022 seen by GISSEL Lee  MsOli Durham is a 31 yo woman, seen at the request of Radha Spears MD, for a new diagnosis of nipple discharge and right breast mass.      She denies any breast lumps, pain or skin changes.  2017 needle biopsy that showed left lactating adenoma which was not removed.     She reports that she is otherwise healthy and does not take any medications.   She has a family history of breast cancer in her Maternal grandmother aged 82.     She was by herself in clinic today.  Today presenting with concerns regarding abnormal imaging and right outer quadrant breast pain since .  The pain comes and goes and gets worse with her period. She has a hx of brady milky discharge for the past 9 years.  Very small amount, makes nipples crusty. Not expressing, not bloody, thinks from one duct    I personally reviewed her records and summarized her relevant breast history/imagin2022 Saw Dr. Jacob  \"Stephanie Durham is a 32 y.o. patient who presents as a new pt with complaints of a right breast mass noted in 2022. Pt reports the mass is persistent but swells during her menstrual cycles. The mass is tender. She has hx of a left lactating adenoma that was biopsied:2017. Maternal GM with breast cancer at age 82. : pt stopped breast feeding 2020. Pt reporting bilateral milky discharge- spontaneous. She states she had a work up in 2018 that was negative. She reports she has not noted any discharge in the last several months. Pt is not on any contraception- she is ok with pregnancy. \"  \"No mass noted of right outer breast were patient's having her complaints. Costochondritis noted as she is very tender between her ribs in this area.  Patient's palpable concerns are equal on the right and the left side and no dominant masses noted.  " "With manipulation of the nipple a milky discharge was released from the left nipple from 1 duct\"    9/21/2022   TSH 1.050  Prolactin 6.9    10/20/2022 diagnostic bilateral mammogram and bilateral limited ultrasound at [facility]  FINDINGS:  There are scattered areas of fibroglandular density. In the left upper outer quadrant there is a 10.5 x 7.3 mm oval nodule and  in the axilla high on image there is a biopsy marker clip associated with a 11 mm nodule.  Targeted ultrasound of the left breast was performed as well as both retroareolar regions. No retroareolar abnormalities are identified on either side. 1 cm from the nipple on the left side, there may be a 7 mm 3 mm x 3 mm well-circumscribed nodule but this could also represent a fat lobule. This Is uniform and nonshadowing. Another similar lesion is noted at 1:00, 2 cm from nipple measuring 8 x 3 x 7 mm. It is also very  similar to the adjacent fat lobules. In the axillary tail region there is a possible lesion identified measuring 13 x 4 x 14 mm. This also is very similar to the adjacent fat  lobule  IMPRESSION:  Right breast single view mammogram and retroareolar ultrasound was negative  Left breast single view mammogram shows 2 nodular densities. One is in the far left upper-outer quadrant and the other in the axilla. The axillary abnormality has a biopsy clip associated with it. No suspicious ultrasound findings are identified in the left breast at the sites. Possible small fibroadenomas versus fat lobules are identified 1:00 near  the nipple and in the left upper-outer quadrant. Recommend 6 month follow-up left MLO view and repeat left breast ultrasound  BI-RADS CATEGORY 3    2/17/2023 seen by GISSEL Lee  Patient presented to the office today for routine follow-up.  She has been using vitamin E daily which has helped significantly with her breast pain.  She is still experiencing breast pain but only around her period.  Also she denies any " more nipple discharge since before being seen the last time that she was here.  She already has imaging set up from May to follow-up on a BI-RADS 3.  She has no new breast concerns today.    11/16/2023 seen by GISSEL Lee  Patient presenting to the office today for routine follow-up.  She has no issues with breast pain anymore her nipple discharge has completely stopped unless she manipulates the left nipple and it is a white discharge.  She does have concerns regarding some swollen tender lymph nodes under her right arm last month they seem to be under her left arm they swell up or tender and then they go away within a week.    6/6/2024 seen by GISSEL Lee  Patient presenting to the office today for routine follow-up.  Her breast pain has still completely resolved.  She is no longer having any nipple discharge.  She is due for left limited ultrasound.  She has no new breast complaints or concerns today.    11/21/2024 seen by GISSEL Lee  Patient presenting to the office today for routine follow-up.  On 11/14/2024 she had a left limited breast ultrasound that resulted as BI-RADS 2 taking her out of surveillance.  Patient has no other breast complaints or concerns today.    4/24/2025 seen by GISSEL Lee  Presenting to the office today with a new issue of left axillary swelling with pregnancy for 4 weeks.  Wearing a sports bra which is helping. Painful to touch.  This happened with last 2 pregnancies.     7/21/2025 Interval History  Previous complaints of left axillary swelling.  She completed left axilla ultrasound on 5/2/2025 suspicious sonographic finding in the left axilla.,  A 1.7 cm circumscribed solid hypoechoic mass in the left axillary tail is not significant change compared to prior ultrasound examination is considered benign. She is due in October with her 4th baby. She continues to having complaints of left axillary swelling however she reports this as typical for her during  pregnancy and will continue to get larger as she breast feeds as well. She denies any breast pain, skin changes or nipple discharge today.     Review of Systems   All other systems reviewed and are negative.      Medications:    Current Outpatient Medications:     aspirin 81 MG chewable tablet, Chew 1 tablet Daily., Disp: , Rfl:     prenatal vitamin (prenatal, CLASSIC, vitamin) tablet, Take  by mouth Daily., Disp: , Rfl:     Allergies:  Allergies   Allergen Reactions    Adhesive Tape Hives     bandaid brand       Medical history:  Past Medical History:   Diagnosis Date    Anemia     Anxiety     panic attacks    Breast lump     on left- followed by Malka Dudley next appointment in 7/2025    Female infertility 2020    Gestational hypertension 2017    History of medical problems 2017    Breast Changes (BIRAD 4) currently go in every 6 months for ultrasound and mammogram)    Hypertension     preeclapmsia with pregnancy    Migraine 2008    occular migraines    Ovarian cyst 2015    Preeclampsia 2017    2017 and 2019    Recurrent pregnancy loss, antepartum condition or complication 2016    3 miscarriages    Urinary tract infection     Visual impairment     corneal abrasions       Surgical History:  Past Surgical History:   Procedure Laterality Date    BREAST SURGERY Left 2017    Left Breast Biopsy    WISDOM TOOTH EXTRACTION  2008       Family History:  Family History   Problem Relation Age of Onset    Mental illness Mother     Arthritis Mother     Anxiety disorder Mother     Cancer Father         Melanoma    Melanoma Father     Learning disabilities Brother     Thyroid disease Maternal Grandmother         Graves Disease    Breast cancer Maternal Grandmother 82    Lung cancer Maternal Grandmother     Hyperthyroidism Paternal Grandmother     Learning disabilities Brother         Dyslexic       Physical Exam  Vitals:    07/21/25 1034   BP: 122/72   Pulse: 72   SpO2: 99%       ECOG 0 - Asymptomatic  General: NAD, well  appearing  Psych: a&o x 3, normal mood and affect  Lymph nodes:  no cervical, supraclavicular or axillary lymphadenopathy. Left accessory breast tissue fullness, no palpable masses.   Breast: symmetric, small, grade I ptosis  Right:  No visible abnormalities on inspection while seated, with arms raised or hands on hips. No masses, skin changes, or nipple abnormalities. No discharge unable to palpate any lymph nodes in axilla  Left:  No skin changes or nipple discharge or abnormalities.   Imagin2023 left diagnostic mammogram and left limited breast ultrasound at [facility]  FINDINGS: Unilateral left digital CC mammographic and MLO mammographic and Tomosynthesis images were obtained. Comparison is made to prior mammography dated 10/20/2022. The parenchyma of the left breast demonstrates a heterogeneously dense pattern which lessens the sensitivity. There is a stable area of focal asymmetry that measures on the order of 1.3 cm in greatest dimension that is seen in the axillary  tail region of the left breast. No architectural distortion or suspicious calcifications are visualized. There is no evidence for skin thickening, nipple retraction or axillary adenopathy.  ULTRASOUND: Targeted sonographic evaluation of the left breast was performed through the 1-o'clock position in a retroareolar location, the 1-o'clock position on the order of 2 cm from the nipple and the 1-o'clock position on the order of 8 cm from the nipple in the axillary tail region. Comparison is made to prior breast sonography dated  10/20/2022. In the retroareolar region at the 1-o'clock position there is a 0.6 x 0.7 x 0.3 cm oval circumscribed hypoechoic mass that previously measured 0.7 x 0.8 x 0.3 cm. No detectable internal vascularity is appreciated. The imaging features suggest a benign fibroadenoma. At the 1-o'clock position on the order of 2 cm from the nipple there is a 0.9 x 0.8 x 0.3 cm oval circumscribed hypoechoic mass that  previously  measured 0.8 x 0.7 x 0.3 cm. The imaging features suggest a benign fibroadenoma.  In the axillary tail region at the 1-o'clock position on the order of 8 cm from the nipple there is a 1.5 x 1.5 x 0.4 cm oval circumscribed hypoechoic masslike region that previously measured 1.4 x 1.4 x 0.4 cm. The imaging features are most consistent with a benign fibroadenoma versus an area of prominent fibrous tissue.  IMPRESSION:  There are 3 stable probable benign lesions in the left breast at the 1-o'clock axis as described. Continue sonographic follow-up of left breast in 6-9 month intervals is recommended through October 2024 to demonstrate two-year stability.  BI-RADS Category 3    11/10/2023 left Limited breast ultrasound at [facility]  FINDINGS: Ultrasound evaluation again demonstrates a small ovoid solid nodule in retroareolar region at 1 o'clock measuring 7 x 3 x 6 mm and showing no change from the previous exams. There is a similar solid appearing nodule located at 1 o'clock, 2 cm from the nipple that measures 6 x 5 x 3 mm compared to a size of 9 x 8 x 3 mm on the previous exam. There is a somewhat elongated ovoid solid nodule in the left  axillary tail located at 1 o'clock, 8 cm from the nipple that measures 1.5 x 1.5 x 0.4 cm and is unchanged.  CONCLUSION: Benign directed left breast ultrasound showing 3 benign-appearing solid nodules as described above. 1 of these is smaller since 05/12/2023. The others are unchanged. A followup directed left breast ultrasound in 1 year might be considered to ensure 2-year stability.   BI-RADS 2. Benign.    11/14/2024 Left limited breast US  Targeted sonographic evaluation of the left breast was performed for follow-up. At 1:00 retroareolar, there is a 0.6 x 0.3 x 0.7 cm mass, which is similar dating back to 10/20/2022, previously 0.8 x 0.2 x 0.7 cm at that time. At 1:00, 2 cm from the nipple, there is a 0.6 x 0.3 x 0.5 cm mass, which is smaller from 2022, previously 0.8 x 0.3 x  0.7 cm at that time. At 1:00, 8 cm from the nipple in the axillary tail, there is a 1.6 x 0.4 x 1.6 cm mass versus prominent fat lobule, which is similar, previously 1.4 x 0.4 x 1.4 cm. These can be considered benign. Recommend annual screening mammogram beginning at age 40 or sooner as direct by clinical risk assessment.  BI-RADS 2    5/2/2025 left axilla ultrasound  FINDINGS:  Targeted sonography of the area of tender palpable lump in the left axilla/left axillary tail was performed.  No sonographic abnormality is identified in this region. The entire left axilla was then studied and there are no abnormal lymph nodes or lesion identified in the left axilla. In the left axillary tail, 8 cm from the nipple, there is an oval circumscribed hypoechogenic solid mass, measuring 1.7 x 0.6 x 1.6 cm, that is not significantly changed compared to prior breast ultrasound examinations dating back to 10/20/2022, and is considered benign.  IMPRESSION:  1. No suspicious sonographic finding in the left axilla. Specifically, there is no sonographic abnormality in the region of reported tender palpable lump. Recommend clinical management of any suspicious palpable finding and of the tenderness. This was discussed with the patient.  2. A 1.7 cm circumscribed solid hypoechogenic mass in the left axillary  tail is not significantly changed compared to multiple prior ultrasound  examinations and is considered benign.    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 She mentions this will get smaller and larger during breast feeding, this has been her norm from last pregnancy/post partum experience.   Reviewed her most recent imaging is benign.     Plan:  -Follow up 6 months  -Will continue to monitor area of  concern postpartum through breast feeding    Case reviewed with Dr. Wheeler.     GISSEL Falcon    I have spent 20 min in face to face time with the patient and in chart review    CC:  MD Pita Ryan Adam, MD

## 2025-07-21 ENCOUNTER — ROUTINE PRENATAL (OUTPATIENT)
Dept: OBSTETRICS AND GYNECOLOGY | Age: 35
End: 2025-07-21
Payer: COMMERCIAL

## 2025-07-21 ENCOUNTER — OFFICE VISIT (OUTPATIENT)
Dept: SURGERY | Facility: CLINIC | Age: 35
End: 2025-07-21
Payer: COMMERCIAL

## 2025-07-21 VITALS
OXYGEN SATURATION: 99 % | HEIGHT: 69 IN | BODY MASS INDEX: 20.08 KG/M2 | WEIGHT: 135.6 LBS | HEART RATE: 72 BPM | DIASTOLIC BLOOD PRESSURE: 72 MMHG | SYSTOLIC BLOOD PRESSURE: 122 MMHG

## 2025-07-21 VITALS — DIASTOLIC BLOOD PRESSURE: 72 MMHG | WEIGHT: 136.2 LBS | SYSTOLIC BLOOD PRESSURE: 104 MMHG | BODY MASS INDEX: 20.11 KG/M2

## 2025-07-21 DIAGNOSIS — O09.299 HISTORY OF PRE-ECLAMPSIA IN PRIOR PREGNANCY, CURRENTLY PREGNANT: ICD-10-CM

## 2025-07-21 DIAGNOSIS — Z3A.26 26 WEEKS GESTATION OF PREGNANCY: Primary | ICD-10-CM

## 2025-07-21 DIAGNOSIS — Z13.89 SCREENING FOR HEMATURIA OR PROTEINURIA: ICD-10-CM

## 2025-07-21 DIAGNOSIS — Z80.3 FAMILY HISTORY OF BREAST CANCER: ICD-10-CM

## 2025-07-21 DIAGNOSIS — Q83.1 ACCESSORY BREAST TISSUE OF AXILLA: Primary | ICD-10-CM

## 2025-07-21 DIAGNOSIS — O20.8 SUBCHORIONIC HEMORRHAGE IN FIRST TRIMESTER: ICD-10-CM

## 2025-07-21 PROCEDURE — 99213 OFFICE O/P EST LOW 20 MIN: CPT | Performed by: NURSE PRACTITIONER

## 2025-07-21 NOTE — PROGRESS NOTES
Chief Complaint   Patient presents with    Routine Prenatal Visit     26 Week's,  3 Day's. Patient has no concerns. Patient state's she recently had a headache over the weekend for 48 hour's, she pushed fluid's and it went away.         HPI  Stephanie Durham is a 35 y.o. female presents for OB visit. She is feeling well. She notes her BP values have remained normal at home. She had a mild headache over the weekend. She denies reg ctx, vag bleeding/leaking fluid.         The following portions of the patient's history were reviewed and updated as appropriate: allergies, current medications, past family history, past medical history, past social history, past surgical history, and problem list.    Review of Systems  : neg for reg ctx, vag bleeding/leaking fluid  Neuro: pos for mild headache that resolved    /72   Wt 61.8 kg (136 lb 3.2 oz)   LMP 01/17/2025   BMI 20.11 kg/m²         Physical Exam  Constitutional:       Appearance: Normal appearance.   Neurological:      General: No focal deficit present.      Mental Status: She is alert and oriented to person, place, and time.   Psychiatric:         Mood and Affect: Mood normal.         Behavior: Behavior normal.       FH= 27 cm  FHT's 126 bpm  Ext: bilateral lower ext are without edema, cords or tenderness      Diagnoses and all orders for this visit:    1. 26 weeks gestation of pregnancy (Primary)  -     POC Urinalysis Dipstick    2. Screening for hematuria or proteinuria  -     POC Urinalysis Dipstick    3. History of pre-eclampsia in prior pregnancy, currently pregnant    4. Subchorionic hemorrhage in first trimester      26 wks: reviewed PTL and preeclamptic warnings. Advised daily fmc's 28+ wks. One hour 2 wks    Hx preeclampsia: on baby asa    KINZA/hx recurrent SAB's: growth surveillance planned

## 2025-07-21 NOTE — LETTER
"2025     Pedro Lema MD  [address]  [facility] [address]    Patient: Stephanie Durham   YOB: 1990   Date of Visit: 2025     Dear Pedro Lema MD:       Thank you for referring Stephanie Durham to me for evaluation. Below are the relevant portions of my assessment and plan of care.    If you have questions, please do not hesitate to call me. I look forward to following Stephanie along with you.         Sincerely,        GISSEL Falcon        CC: MD Aaron Ryan Tracey, APRN  [id number]  Sign when Signing Visit  [facility]     Referring Provider: Radha Spears MD     Chief complaint: left axillary swelling and breast mass     HPI:   2022 seen by GISSEL Lee  Ms. Stephanie Durham is a 33 yo woman, seen at the request of Radha Spears MD, for a new diagnosis of nipple discharge and right breast mass.      She denies any breast lumps, pain or skin changes.  2017 needle biopsy that showed left lactating adenoma which was not removed.     She reports that she is otherwise healthy and does not take any medications.   She has a family history of breast cancer in her Maternal grandmother aged 82.     She was by herself in clinic today.  Today presenting with concerns regarding abnormal imaging and right outer quadrant breast pain since .  The pain comes and goes and gets worse with her period. She has a hx of brady milky discharge for the past 9 years.  Very small amount, makes nipples crusty. Not expressing, not bloody, thinks from one duct    I personally reviewed her records and summarized her relevant breast history/imagin2022 Saw Dr. Jacob  \"Stephanie Durham is a 32 y.o. patient who presents as a new pt with complaints of a right breast mass noted in 2022. Pt reports the mass is persistent but swells during her menstrual cycles. The mass is tender. She has hx of a left lactating adenoma that was biopsied:2017. " "Maternal GM with breast cancer at age 82. : pt stopped breast feeding 2020. Pt reporting bilateral milky discharge- spontaneous. She states she had a work up in 2018 that was negative. She reports she has not noted any discharge in the last several months. Pt is not on any contraception- she is ok with pregnancy. \"  \"No mass noted of right outer breast were patient's having her complaints. Costochondritis noted as she is very tender between her ribs in this area.  Patient's palpable concerns are equal on the right and the left side and no dominant masses noted.  With manipulation of the nipple a milky discharge was released from the left nipple from 1 duct\"    2022   TSH 1.050  Prolactin 6.9    10/20/2022 diagnostic bilateral mammogram and bilateral limited ultrasound at [facility]  FINDINGS:  There are scattered areas of fibroglandular density. In the left upper outer quadrant there is a 10.5 x 7.3 mm oval nodule and  in the axilla high on image there is a biopsy marker clip associated with a 11 mm nodule.  Targeted ultrasound of the left breast was performed as well as both retroareolar regions. No retroareolar abnormalities are identified on either side. 1 cm from the nipple on the left side, there may be a 7 mm 3 mm x 3 mm well-circumscribed nodule but this could also represent a fat lobule. This Is uniform and nonshadowing. Another similar lesion is noted at 1:00, 2 cm from nipple measuring 8 x 3 x 7 mm. It is also very  similar to the adjacent fat lobules. In the axillary tail region there is a possible lesion identified measuring 13 x 4 x 14 mm. This also is very similar to the adjacent fat  lobule  IMPRESSION:  Right breast single view mammogram and retroareolar ultrasound was negative  Left breast single view mammogram shows 2 nodular densities. One is in the far left upper-outer quadrant and the other in the axilla. The axillary abnormality has a biopsy clip associated with it. " No suspicious ultrasound findings are identified in the left breast at the sites. Possible small fibroadenomas versus fat lobules are identified 1:00 near  the nipple and in the left upper-outer quadrant. Recommend 6 month follow-up left MLO view and repeat left breast ultrasound  BI-RADS CATEGORY 3    2/17/2023 seen by GISSEL Lee  Patient presented to the office today for routine follow-up.  She has been using vitamin E daily which has helped significantly with her breast pain.  She is still experiencing breast pain but only around her period.  Also she denies any more nipple discharge since before being seen the last time that she was here.  She already has imaging set up from May to follow-up on a BI-RADS 3.  She has no new breast concerns today.    11/16/2023 seen by GISSEL Lee  Patient presenting to the office today for routine follow-up.  She has no issues with breast pain anymore her nipple discharge has completely stopped unless she manipulates the left nipple and it is a white discharge.  She does have concerns regarding some swollen tender lymph nodes under her right arm last month they seem to be under her left arm they swell up or tender and then they go away within a week.    6/6/2024 seen by GISSEL Lee  Patient presenting to the office today for routine follow-up.  Her breast pain has still completely resolved.  She is no longer having any nipple discharge.  She is due for left limited ultrasound.  She has no new breast complaints or concerns today.    11/21/2024 seen by GISSEL Lee  Patient presenting to the office today for routine follow-up.  On 11/14/2024 she had a left limited breast ultrasound that resulted as BI-RADS 2 taking her out of surveillance.  Patient has no other breast complaints or concerns today.    4/24/2025 seen by GISSEL Lee  Presenting to the office today with a new issue of left axillary swelling with pregnancy for 4 weeks.  Wearing a  sports bra which is helping. Painful to touch.  This happened with last 2 pregnancies.     7/21/2025 Interval History  Previous complaints of left axillary swelling.  She completed left axilla ultrasound on 5/2/2025 suspicious sonographic finding in the left axilla.,  A 1.7 cm circumscribed solid hypoechoic mass in the left axillary tail is not significant change compared to prior ultrasound examination is considered benign. She is due in October with her 4th baby. She continues to having complaints of left axillary swelling however she reports this as typical for her during pregnancy and will continue to get larger as she breast feeds as well. She denies any breast pain, skin changes or nipple discharge today.     Review of Systems   All other systems reviewed and are negative.      Medications:    Current Outpatient Medications:   •  aspirin 81 MG chewable tablet, Chew 1 tablet Daily., Disp: , Rfl:   •  prenatal vitamin (prenatal, CLASSIC, vitamin) tablet, Take  by mouth Daily., Disp: , Rfl:     Allergies:  Allergies   Allergen Reactions   • Adhesive Tape Hives     bandaid brand       Medical history:  Past Medical History:   Diagnosis Date   • Anemia    • Anxiety     panic attacks   • Breast lump     on left- followed by Malka Dudley next appointment in 7/2025   • Female infertility 2020   • Gestational hypertension 2017   • History of medical problems 2017    Breast Changes (BIRAD 4) currently go in every 6 months for ultrasound and mammogram)   • Hypertension     preeclapmsia with pregnancy   • Migraine 2008    occular migraines   • Ovarian cyst 2015   • Preeclampsia 2017    2017 and 2019   • Recurrent pregnancy loss, antepartum condition or complication 2016    3 miscarriages   • Urinary tract infection    • Visual impairment     corneal abrasions       Surgical History:  Past Surgical History:   Procedure Laterality Date   • BREAST SURGERY Left 2017    Left Breast Biopsy   • WISDOM TOOTH EXTRACTION  2008        Family History:  Family History   Problem Relation Age of Onset   • Mental illness Mother    • Arthritis Mother    • Anxiety disorder Mother    • Cancer Father         Melanoma   • Melanoma Father    • Learning disabilities Brother    • Thyroid disease Maternal Grandmother         Graves Disease   • Breast cancer Maternal Grandmother 82   • Lung cancer Maternal Grandmother    • Hyperthyroidism Paternal Grandmother    • Learning disabilities Brother         Dyslexic       Physical Exam  Vitals:    25 1034   BP: 122/72   Pulse: 72   SpO2: 99%       ECOG 0 - Asymptomatic  General: NAD, well appearing  Psych: a&o x 3, normal mood and affect  Lymph nodes:  no cervical, supraclavicular or axillary lymphadenopathy  Breast: symmetric, small, grade I ptosis  Right:  No visible abnormalities on inspection while seated, with arms raised or hands on hips. No masses, skin changes, or nipple abnormalities. No discharge unable to palpate any lymph nodes in axilla  Left:  No skin changes or nipple discharge or abnormalities. Left axillary swelling noted.      Imagin2023 left diagnostic mammogram and left limited breast ultrasound at [facility]  FINDINGS: Unilateral left digital CC mammographic and MLO mammographic and Tomosynthesis images were obtained. Comparison is made to prior mammography dated 10/20/2022. The parenchyma of the left breast demonstrates a heterogeneously dense pattern which lessens the sensitivity. There is a stable area of focal asymmetry that measures on the order of 1.3 cm in greatest dimension that is seen in the axillary  tail region of the left breast. No architectural distortion or suspicious calcifications are visualized. There is no evidence for skin thickening, nipple retraction or axillary adenopathy.  ULTRASOUND: Targeted sonographic evaluation of the left breast was performed through the 1-o'clock position in a retroareolar location, the 1-o'clock position on the order of 2 cm from the  nipple and the 1-o'clock position on the order of 8 cm from the nipple in the axillary tail region. Comparison is made to prior breast sonography dated  10/20/2022. In the retroareolar region at the 1-o'clock position there is a 0.6 x 0.7 x 0.3 cm oval circumscribed hypoechoic mass that previously measured 0.7 x 0.8 x 0.3 cm. No detectable internal vascularity is appreciated. The imaging features suggest a benign fibroadenoma. At the 1-o'clock position on the order of 2 cm from the nipple there is a 0.9 x 0.8 x 0.3 cm oval circumscribed hypoechoic mass that previously  measured 0.8 x 0.7 x 0.3 cm. The imaging features suggest a benign fibroadenoma.  In the axillary tail region at the 1-o'clock position on the order of 8 cm from the nipple there is a 1.5 x 1.5 x 0.4 cm oval circumscribed hypoechoic masslike region that previously measured 1.4 x 1.4 x 0.4 cm. The imaging features are most consistent with a benign fibroadenoma versus an area of prominent fibrous tissue.  IMPRESSION:  There are 3 stable probable benign lesions in the left breast at the 1-o'clock axis as described. Continue sonographic follow-up of left breast in 6-9 month intervals is recommended through October 2024 to demonstrate two-year stability.  BI-RADS Category 3    11/10/2023 left Limited breast ultrasound at [facility]  FINDINGS: Ultrasound evaluation again demonstrates a small ovoid solid nodule in retroareolar region at 1 o'clock measuring 7 x 3 x 6 mm and showing no change from the previous exams. There is a similar solid appearing nodule located at 1 o'clock, 2 cm from the nipple that measures 6 x 5 x 3 mm compared to a size of 9 x 8 x 3 mm on the previous exam. There is a somewhat elongated ovoid solid nodule in the left  axillary tail located at 1 o'clock, 8 cm from the nipple that measures 1.5 x 1.5 x 0.4 cm and is unchanged.  CONCLUSION: Benign directed left breast ultrasound showing 3 benign-appearing solid nodules as described above. 1 of  these is smaller since 05/12/2023. The others are unchanged. A followup directed left breast ultrasound in 1 year might be considered to ensure 2-year stability.   BI-RADS 2. Benign.    11/14/2024 Left limited breast US  Targeted sonographic evaluation of the left breast was performed for follow-up. At 1:00 retroareolar, there is a 0.6 x 0.3 x 0.7 cm mass, which is similar dating back to 10/20/2022, previously 0.8 x 0.2 x 0.7 cm at that time. At 1:00, 2 cm from the nipple, there is a 0.6 x 0.3 x 0.5 cm mass, which is smaller from 2022, previously 0.8 x 0.3 x 0.7 cm at that time. At 1:00, 8 cm from the nipple in the axillary tail, there is a 1.6 x 0.4 x 1.6 cm mass versus prominent fat lobule, which is similar, previously 1.4 x 0.4 x 1.4 cm. These can be considered benign. Recommend annual screening mammogram beginning at age 40 or sooner as direct by clinical risk assessment.  BI-RADS 2    5/2/2025 left axilla ultrasound  FINDINGS:  Targeted sonography of the area of tender palpable lump in the left axilla/left axillary tail was performed.  No sonographic abnormality is identified in this region. The entire left axilla was then studied and there are no abnormal lymph nodes or lesion identified in the left axilla. In the left axillary tail, 8 cm from the nipple, there is an oval circumscribed hypoechogenic solid mass, measuring 1.7 x 0.6 x 1.6 cm, that is not significantly changed compared to prior breast ultrasound examinations dating back to 10/20/2022, and is considered benign.  IMPRESSION:  1. No suspicious sonographic finding in the left axilla. Specifically, there is no sonographic abnormality in the region of reported tender palpable lump. Recommend clinical management of any suspicious palpable finding and of the tenderness. This was discussed with the patient.  2. A 1.7 cm circumscribed solid hypoechogenic mass in the left axillary  tail is not significantly changed compared to multiple prior  ultrasound  examinations and is considered benign.    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 She mentions this will get smaller and larger during breast feeding, this has been her norm from last pregnancy/post partum experience.   Reviewed her most recent imaging is benign.     Plan:  -Follow up 6 months  -Will continue to monitor area of concern postpartum through breast feeding    Case reviewed with Dr. Wheeler.     GISSEL Falcon    I have spent 20 min in face to face time with the patient and in chart review    CC:  MD Pita Ryan, MD Pedro

## 2025-07-30 ENCOUNTER — APPOINTMENT (OUTPATIENT)
Dept: PHYSICAL THERAPY | Facility: HOSPITAL | Age: 35
End: 2025-07-30
Payer: COMMERCIAL

## 2025-08-06 PROBLEM — N64.4 BREAST PAIN: Status: RESOLVED | Noted: 2022-11-18 | Resolved: 2025-08-06

## 2025-08-06 PROBLEM — N64.3 GALACTORRHEA NOT ASSOCIATED WITH CHILDBIRTH: Status: RESOLVED | Noted: 2022-09-21 | Resolved: 2025-08-06

## 2025-08-08 ENCOUNTER — ROUTINE PRENATAL (OUTPATIENT)
Dept: OBSTETRICS AND GYNECOLOGY | Age: 35
End: 2025-08-08
Payer: COMMERCIAL

## 2025-08-08 VITALS — SYSTOLIC BLOOD PRESSURE: 116 MMHG | DIASTOLIC BLOOD PRESSURE: 66 MMHG | BODY MASS INDEX: 20.43 KG/M2 | WEIGHT: 138.4 LBS

## 2025-08-08 DIAGNOSIS — O20.8 SUBCHORIONIC HEMORRHAGE IN FIRST TRIMESTER: ICD-10-CM

## 2025-08-08 DIAGNOSIS — Z13.0 SCREENING FOR IRON DEFICIENCY ANEMIA: ICD-10-CM

## 2025-08-08 DIAGNOSIS — Z13.89 SCREENING FOR HEMATURIA OR PROTEINURIA: Primary | ICD-10-CM

## 2025-08-08 DIAGNOSIS — Z13.1 SCREENING FOR DIABETES MELLITUS: ICD-10-CM

## 2025-08-08 DIAGNOSIS — Z34.80 SUPERVISION OF OTHER NORMAL PREGNANCY, ANTEPARTUM: ICD-10-CM

## 2025-08-08 DIAGNOSIS — O09.299 HISTORY OF PRE-ECLAMPSIA IN PRIOR PREGNANCY, CURRENTLY PREGNANT: ICD-10-CM

## 2025-08-08 DIAGNOSIS — N96 RECURRENT PREGNANCY LOSS: ICD-10-CM

## 2025-08-08 DIAGNOSIS — Z3A.29 29 WEEKS GESTATION OF PREGNANCY: ICD-10-CM

## 2025-08-11 LAB
BASOPHILS # BLD AUTO: 0 X10E3/UL (ref 0–0.2)
BASOPHILS NFR BLD AUTO: 0 %
EOSINOPHIL # BLD AUTO: 0.1 X10E3/UL (ref 0–0.4)
EOSINOPHIL NFR BLD AUTO: 1 %
ERYTHROCYTE [DISTWIDTH] IN BLOOD BY AUTOMATED COUNT: 11.5 % (ref 11.7–15.4)
GLUCOSE 1H P 50 G GLC PO SERPL-MCNC: 96 MG/DL (ref 70–139)
HCT VFR BLD AUTO: 33.5 % (ref 34–46.6)
HGB BLD-MCNC: 11.1 G/DL (ref 11.1–15.9)
IMM GRANULOCYTES # BLD AUTO: 0.2 X10E3/UL (ref 0–0.1)
IMM GRANULOCYTES NFR BLD AUTO: 2 %
LYMPHOCYTES # BLD AUTO: 1.7 X10E3/UL (ref 0.7–3.1)
LYMPHOCYTES NFR BLD AUTO: 19 %
MCH RBC QN AUTO: 32.9 PG (ref 26.6–33)
MCHC RBC AUTO-ENTMCNC: 33.1 G/DL (ref 31.5–35.7)
MCV RBC AUTO: 99 FL (ref 79–97)
MONOCYTES # BLD AUTO: 0.6 X10E3/UL (ref 0.1–0.9)
MONOCYTES NFR BLD AUTO: 6 %
NEUTROPHILS # BLD AUTO: 6.7 X10E3/UL (ref 1.4–7)
NEUTROPHILS NFR BLD AUTO: 72 %
PLATELET # BLD AUTO: 269 X10E3/UL (ref 150–450)
RBC # BLD AUTO: 3.37 X10E6/UL (ref 3.77–5.28)
TREPONEMA PALLIDUM IGG+IGM AB [PRESENCE] IN SERUM OR PLASMA BY IMMUNOASSAY: NON REACTIVE
WBC # BLD AUTO: 9.3 X10E3/UL (ref 3.4–10.8)

## 2025-08-12 ENCOUNTER — HOSPITAL ENCOUNTER (OUTPATIENT)
Dept: PHYSICAL THERAPY | Facility: HOSPITAL | Age: 35
Setting detail: THERAPIES SERIES
Discharge: HOME | End: 2025-08-12
Payer: COMMERCIAL

## 2025-08-12 DIAGNOSIS — R29.898 HIP WEAKNESS: ICD-10-CM

## 2025-08-12 DIAGNOSIS — M25.551 BILATERAL HIP PAIN: Primary | ICD-10-CM

## 2025-08-12 DIAGNOSIS — M25.552 BILATERAL HIP PAIN: Primary | ICD-10-CM

## 2025-08-12 DIAGNOSIS — M54.50 ACUTE MIDLINE LOW BACK PAIN WITHOUT SCIATICA: ICD-10-CM

## 2025-08-12 PROBLEM — O99.019 MATERNAL ANEMIA IN PREGNANCY, ANTEPARTUM: Status: ACTIVE | Noted: 2025-08-12

## 2025-08-12 PROCEDURE — 97110 THERAPEUTIC EXERCISES: CPT

## 2025-08-12 RX ORDER — FERROUS SULFATE 325(65) MG
325 TABLET ORAL
Qty: 60 TABLET | Refills: 5 | Status: SHIPPED | OUTPATIENT
Start: 2025-08-12

## 2025-08-13 ENCOUNTER — TELEPHONE (OUTPATIENT)
Dept: OBSTETRICS AND GYNECOLOGY | Age: 35
End: 2025-08-13
Payer: COMMERCIAL

## 2025-08-13 ENCOUNTER — HOSPITAL ENCOUNTER (INPATIENT)
Facility: HOSPITAL | Age: 35
LOS: 2 days | Discharge: HOME | End: 2025-08-15
Attending: OBSTETRICS & GYNECOLOGY | Admitting: OBSTETRICS & GYNECOLOGY
Payer: COMMERCIAL

## 2025-08-13 PROBLEM — R51.9 HEADACHE IN PREGNANCY, ANTEPARTUM: Status: ACTIVE | Noted: 2025-08-13

## 2025-08-13 PROBLEM — O26.899 HEADACHE IN PREGNANCY, ANTEPARTUM: Status: ACTIVE | Noted: 2025-08-13

## 2025-08-13 PROBLEM — Z34.90 PREGNANCY: Status: ACTIVE | Noted: 2025-08-13

## 2025-08-13 PROBLEM — O47.00 PRETERM UTERINE CONTRACTIONS, ANTEPARTUM: Status: ACTIVE | Noted: 2025-08-13

## 2025-08-13 LAB
ABO GROUP BLD: NORMAL
ALBUMIN SERPL-MCNC: 3.7 G/DL (ref 3.5–5.2)
ALBUMIN/GLOB SERPL: 1.4 G/DL
ALP SERPL-CCNC: 79 U/L (ref 39–117)
ALT SERPL W P-5'-P-CCNC: 11 U/L (ref 1–33)
ANION GAP SERPL CALCULATED.3IONS-SCNC: 12.1 MMOL/L (ref 5–15)
AST SERPL-CCNC: 20 U/L (ref 1–32)
BASOPHILS # BLD AUTO: 0.04 10*3/MM3 (ref 0–0.2)
BASOPHILS NFR BLD AUTO: 0.4 % (ref 0–1.5)
BILIRUB SERPL-MCNC: 0.3 MG/DL (ref 0–1.2)
BILIRUB UR QL STRIP: NEGATIVE
BLD GP AB SCN SERPL QL: NEGATIVE
BUN SERPL-MCNC: 6 MG/DL (ref 6–20)
BUN/CREAT SERPL: 13 (ref 7–25)
CALCIUM SPEC-SCNC: 9 MG/DL (ref 8.6–10.5)
CHLORIDE SERPL-SCNC: 105 MMOL/L (ref 98–107)
CLARITY UR: ABNORMAL
CO2 SERPL-SCNC: 20.9 MMOL/L (ref 22–29)
COLOR UR: YELLOW
CREAT SERPL-MCNC: 0.46 MG/DL (ref 0.57–1)
CREAT UR-MCNC: 13.5 MG/DL
DEPRECATED RDW RBC AUTO: 40 FL (ref 37–54)
EGFRCR SERPLBLD CKD-EPI 2021: 129 ML/MIN/1.73
EOSINOPHIL # BLD AUTO: 0.12 10*3/MM3 (ref 0–0.4)
EOSINOPHIL NFR BLD AUTO: 1.2 % (ref 0.3–6.2)
ERYTHROCYTE [DISTWIDTH] IN BLOOD BY AUTOMATED COUNT: 11.6 % (ref 12.3–15.4)
GLOBULIN UR ELPH-MCNC: 2.6 GM/DL
GLUCOSE SERPL-MCNC: 78 MG/DL (ref 65–99)
GLUCOSE UR STRIP-MCNC: NEGATIVE MG/DL
HCT VFR BLD AUTO: 31.5 % (ref 34–46.6)
HGB BLD-MCNC: 10.9 G/DL (ref 12–15.9)
HGB UR QL STRIP.AUTO: NEGATIVE
IMM GRANULOCYTES # BLD AUTO: 0.18 10*3/MM3 (ref 0–0.05)
IMM GRANULOCYTES NFR BLD AUTO: 1.8 % (ref 0–0.5)
KETONES UR QL STRIP: ABNORMAL
LEUKOCYTE ESTERASE UR QL STRIP.AUTO: NEGATIVE
LYMPHOCYTES # BLD AUTO: 1.98 10*3/MM3 (ref 0.7–3.1)
LYMPHOCYTES NFR BLD AUTO: 19.5 % (ref 19.6–45.3)
MCH RBC QN AUTO: 33 PG (ref 26.6–33)
MCHC RBC AUTO-ENTMCNC: 34.6 G/DL (ref 31.5–35.7)
MCV RBC AUTO: 95.5 FL (ref 79–97)
MONOCYTES # BLD AUTO: 0.61 10*3/MM3 (ref 0.1–0.9)
MONOCYTES NFR BLD AUTO: 6 % (ref 5–12)
NEUTROPHILS NFR BLD AUTO: 7.21 10*3/MM3 (ref 1.7–7)
NEUTROPHILS NFR BLD AUTO: 71.1 % (ref 42.7–76)
NITRITE UR QL STRIP: NEGATIVE
NRBC BLD AUTO-RTO: 0 /100 WBC (ref 0–0.2)
PH UR STRIP.AUTO: 6.5 [PH] (ref 5–8)
PLATELET # BLD AUTO: 243 10*3/MM3 (ref 140–450)
PMV BLD AUTO: 9.4 FL (ref 6–12)
POTASSIUM SERPL-SCNC: 4 MMOL/L (ref 3.5–5.2)
PROT ?TM UR-MCNC: 4.2 MG/DL
PROT SERPL-MCNC: 6.3 G/DL (ref 6–8.5)
PROT UR QL STRIP: NEGATIVE
PROT/CREAT UR: 311.1 MG/G CREA (ref 0–200)
RBC # BLD AUTO: 3.3 10*6/MM3 (ref 3.77–5.28)
RH BLD: POSITIVE
SODIUM SERPL-SCNC: 138 MMOL/L (ref 136–145)
SP GR UR STRIP: <=1.005 (ref 1–1.03)
T&S EXPIRATION DATE: NORMAL
TREPONEMA PALLIDUM IGG+IGM AB [PRESENCE] IN SERUM OR PLASMA BY IMMUNOASSAY: NORMAL
UROBILINOGEN UR QL STRIP: ABNORMAL
WBC NRBC COR # BLD AUTO: 10.14 10*3/MM3 (ref 3.4–10.8)

## 2025-08-13 PROCEDURE — 81003 URINALYSIS AUTO W/O SCOPE: CPT | Performed by: OBSTETRICS & GYNECOLOGY

## 2025-08-13 PROCEDURE — 85025 COMPLETE CBC W/AUTO DIFF WBC: CPT | Performed by: OBSTETRICS & GYNECOLOGY

## 2025-08-13 PROCEDURE — 86850 RBC ANTIBODY SCREEN: CPT | Performed by: OBSTETRICS & GYNECOLOGY

## 2025-08-13 PROCEDURE — 25810000003 LACTATED RINGERS PER 1000 ML: Performed by: OBSTETRICS & GYNECOLOGY

## 2025-08-13 PROCEDURE — 80053 COMPREHEN METABOLIC PANEL: CPT | Performed by: OBSTETRICS & GYNECOLOGY

## 2025-08-13 PROCEDURE — 25810000003 LACTATED RINGERS SOLUTION: Performed by: OBSTETRICS & GYNECOLOGY

## 2025-08-13 PROCEDURE — 86900 BLOOD TYPING SEROLOGIC ABO: CPT | Performed by: OBSTETRICS & GYNECOLOGY

## 2025-08-13 PROCEDURE — 86780 TREPONEMA PALLIDUM: CPT | Performed by: OBSTETRICS & GYNECOLOGY

## 2025-08-13 PROCEDURE — 25010000002 BETAMETHASONE ACET & SOD PHOS PER 4 MG: Performed by: OBSTETRICS & GYNECOLOGY

## 2025-08-13 PROCEDURE — 86901 BLOOD TYPING SEROLOGIC RH(D): CPT | Performed by: OBSTETRICS & GYNECOLOGY

## 2025-08-13 PROCEDURE — 84156 ASSAY OF PROTEIN URINE: CPT | Performed by: OBSTETRICS & GYNECOLOGY

## 2025-08-13 PROCEDURE — 25010000002 PENICILLIN G POTASSIUM PER 600000 UNITS: Performed by: OBSTETRICS & GYNECOLOGY

## 2025-08-13 PROCEDURE — 99285 EMERGENCY DEPT VISIT HI MDM: CPT | Performed by: OBSTETRICS & GYNECOLOGY

## 2025-08-13 PROCEDURE — 25010000002 DIPHENHYDRAMINE PER 50 MG: Performed by: OBSTETRICS & GYNECOLOGY

## 2025-08-13 PROCEDURE — 99222 1ST HOSP IP/OBS MODERATE 55: CPT | Performed by: OBSTETRICS & GYNECOLOGY

## 2025-08-13 PROCEDURE — 82570 ASSAY OF URINE CREATININE: CPT | Performed by: OBSTETRICS & GYNECOLOGY

## 2025-08-13 PROCEDURE — 87086 URINE CULTURE/COLONY COUNT: CPT | Performed by: OBSTETRICS & GYNECOLOGY

## 2025-08-13 RX ORDER — DOCUSATE SODIUM 100 MG/1
100 CAPSULE, LIQUID FILLED ORAL 2 TIMES DAILY PRN
Status: DISCONTINUED | OUTPATIENT
Start: 2025-08-13 | End: 2025-08-15 | Stop reason: HOSPADM

## 2025-08-13 RX ORDER — SODIUM CHLORIDE 0.9 % (FLUSH) 0.9 %
10 SYRINGE (ML) INJECTION AS NEEDED
Status: DISCONTINUED | OUTPATIENT
Start: 2025-08-13 | End: 2025-08-14

## 2025-08-13 RX ORDER — SODIUM CHLORIDE 9 MG/ML
40 INJECTION, SOLUTION INTRAVENOUS AS NEEDED
Status: DISCONTINUED | OUTPATIENT
Start: 2025-08-13 | End: 2025-08-14

## 2025-08-13 RX ORDER — ONDANSETRON 4 MG/1
8 TABLET, ORALLY DISINTEGRATING ORAL EVERY 8 HOURS PRN
Status: DISCONTINUED | OUTPATIENT
Start: 2025-08-13 | End: 2025-08-15 | Stop reason: HOSPADM

## 2025-08-13 RX ORDER — PENICILLIN G 3000000 [IU]/50ML
3 INJECTION, SOLUTION INTRAVENOUS EVERY 4 HOURS
Status: DISCONTINUED | OUTPATIENT
Start: 2025-08-13 | End: 2025-08-14

## 2025-08-13 RX ORDER — ONDANSETRON 2 MG/ML
4 INJECTION INTRAMUSCULAR; INTRAVENOUS EVERY 8 HOURS PRN
Status: DISCONTINUED | OUTPATIENT
Start: 2025-08-13 | End: 2025-08-15 | Stop reason: HOSPADM

## 2025-08-13 RX ORDER — CALCIUM GLUCONATE 98 MG/ML
1 INJECTION, SOLUTION INTRAVENOUS AS NEEDED
Status: DISCONTINUED | OUTPATIENT
Start: 2025-08-13 | End: 2025-08-15 | Stop reason: HOSPADM

## 2025-08-13 RX ORDER — SODIUM CHLORIDE, SODIUM LACTATE, POTASSIUM CHLORIDE, CALCIUM CHLORIDE 600; 310; 30; 20 MG/100ML; MG/100ML; MG/100ML; MG/100ML
75 INJECTION, SOLUTION INTRAVENOUS CONTINUOUS
Status: DISCONTINUED | OUTPATIENT
Start: 2025-08-13 | End: 2025-08-15 | Stop reason: HOSPADM

## 2025-08-13 RX ORDER — ACETAMINOPHEN 500 MG
1000 TABLET ORAL ONCE
Status: COMPLETED | OUTPATIENT
Start: 2025-08-13 | End: 2025-08-13

## 2025-08-13 RX ORDER — ACETAMINOPHEN 325 MG/1
650 TABLET ORAL EVERY 4 HOURS PRN
Status: DISCONTINUED | OUTPATIENT
Start: 2025-08-13 | End: 2025-08-15 | Stop reason: HOSPADM

## 2025-08-13 RX ORDER — MAGNESIUM SULFATE HEPTAHYDRATE 40 MG/ML
2 INJECTION, SOLUTION INTRAVENOUS CONTINUOUS
Status: DISCONTINUED | OUTPATIENT
Start: 2025-08-13 | End: 2025-08-15 | Stop reason: HOSPADM

## 2025-08-13 RX ORDER — INDOMETHACIN 25 MG/1
25 CAPSULE ORAL EVERY 6 HOURS
Status: DISCONTINUED | OUTPATIENT
Start: 2025-08-14 | End: 2025-08-15 | Stop reason: HOSPADM

## 2025-08-13 RX ORDER — BUTORPHANOL TARTRATE 2 MG/ML
2 INJECTION, SOLUTION INTRAMUSCULAR; INTRAVENOUS
Status: DISCONTINUED | OUTPATIENT
Start: 2025-08-13 | End: 2025-08-15 | Stop reason: HOSPADM

## 2025-08-13 RX ORDER — LIDOCAINE HYDROCHLORIDE 10 MG/ML
0.5 INJECTION, SOLUTION INFILTRATION; PERINEURAL ONCE AS NEEDED
Status: DISCONTINUED | OUTPATIENT
Start: 2025-08-13 | End: 2025-08-14

## 2025-08-13 RX ORDER — DIPHENHYDRAMINE HYDROCHLORIDE 50 MG/ML
25 INJECTION, SOLUTION INTRAMUSCULAR; INTRAVENOUS ONCE
Status: COMPLETED | OUTPATIENT
Start: 2025-08-13 | End: 2025-08-13

## 2025-08-13 RX ORDER — BETAMETHASONE SODIUM PHOSPHATE AND BETAMETHASONE ACETATE 3; 3 MG/ML; MG/ML
12 INJECTION, SUSPENSION INTRA-ARTICULAR; INTRALESIONAL; INTRAMUSCULAR; SOFT TISSUE EVERY 24 HOURS
Status: COMPLETED | OUTPATIENT
Start: 2025-08-13 | End: 2025-08-14

## 2025-08-13 RX ORDER — CALCIUM CARBONATE 500 MG/1
2 TABLET, CHEWABLE ORAL DAILY PRN
Status: DISCONTINUED | OUTPATIENT
Start: 2025-08-13 | End: 2025-08-15 | Stop reason: HOSPADM

## 2025-08-13 RX ORDER — INDOMETHACIN 50 MG/1
50 CAPSULE ORAL ONCE
Status: COMPLETED | OUTPATIENT
Start: 2025-08-13 | End: 2025-08-13

## 2025-08-13 RX ORDER — SODIUM CHLORIDE 0.9 % (FLUSH) 0.9 %
10 SYRINGE (ML) INJECTION EVERY 12 HOURS SCHEDULED
Status: DISCONTINUED | OUTPATIENT
Start: 2025-08-13 | End: 2025-08-14

## 2025-08-13 RX ORDER — BISACODYL 10 MG
10 SUPPOSITORY, RECTAL RECTAL DAILY PRN
Status: DISCONTINUED | OUTPATIENT
Start: 2025-08-13 | End: 2025-08-15 | Stop reason: HOSPADM

## 2025-08-13 RX ADMIN — DIPHENHYDRAMINE HYDROCHLORIDE 25 MG: 50 INJECTION INTRAMUSCULAR; INTRAVENOUS at 16:40

## 2025-08-13 RX ADMIN — BETAMETHASONE SODIUM PHOSPHATE AND BETAMETHASONE ACETATE 12 MG: 3; 3 INJECTION, SUSPENSION INTRA-ARTICULAR; INTRALESIONAL; INTRAMUSCULAR at 19:46

## 2025-08-13 RX ADMIN — INDOMETHACIN 50 MG: 50 CAPSULE ORAL at 23:01

## 2025-08-13 RX ADMIN — PENICILLIN G POTASSIUM 5 MILLION UNITS: 5000000 INJECTION, POWDER, FOR SOLUTION INTRAMUSCULAR; INTRAVENOUS at 20:36

## 2025-08-13 RX ADMIN — SODIUM CHLORIDE, POTASSIUM CHLORIDE, SODIUM LACTATE AND CALCIUM CHLORIDE 1000 ML: 600; 310; 30; 20 INJECTION, SOLUTION INTRAVENOUS at 16:40

## 2025-08-13 RX ADMIN — ACETAMINOPHEN 1000 MG: 500 TABLET, FILM COATED ORAL at 16:33

## 2025-08-13 RX ADMIN — SODIUM CHLORIDE, POTASSIUM CHLORIDE, SODIUM LACTATE AND CALCIUM CHLORIDE 75 ML/HR: 600; 310; 30; 20 INJECTION, SOLUTION INTRAVENOUS at 19:44

## 2025-08-14 LAB
ALBUMIN SERPL-MCNC: 3.3 G/DL (ref 3.5–5.2)
ALBUMIN/GLOB SERPL: 1.3 G/DL
ALP SERPL-CCNC: 68 U/L (ref 39–117)
ALT SERPL W P-5'-P-CCNC: 13 U/L (ref 1–33)
ANION GAP SERPL CALCULATED.3IONS-SCNC: 10 MMOL/L (ref 5–15)
AST SERPL-CCNC: 22 U/L (ref 1–32)
BASOPHILS # BLD AUTO: 0.01 10*3/MM3 (ref 0–0.2)
BASOPHILS NFR BLD AUTO: 0.1 % (ref 0–1.5)
BILIRUB SERPL-MCNC: 0.3 MG/DL (ref 0–1.2)
BUN SERPL-MCNC: 4 MG/DL (ref 6–20)
BUN/CREAT SERPL: 8.7 (ref 7–25)
CALCIUM SPEC-SCNC: 6.8 MG/DL (ref 8.6–10.5)
CHLORIDE SERPL-SCNC: 101 MMOL/L (ref 98–107)
CO2 SERPL-SCNC: 20 MMOL/L (ref 22–29)
CREAT SERPL-MCNC: 0.46 MG/DL (ref 0.57–1)
DEPRECATED RDW RBC AUTO: 41.7 FL (ref 37–54)
EGFRCR SERPLBLD CKD-EPI 2021: 129 ML/MIN/1.73
EOSINOPHIL # BLD AUTO: 0 10*3/MM3 (ref 0–0.4)
EOSINOPHIL NFR BLD AUTO: 0 % (ref 0.3–6.2)
ERYTHROCYTE [DISTWIDTH] IN BLOOD BY AUTOMATED COUNT: 11.6 % (ref 12.3–15.4)
GLOBULIN UR ELPH-MCNC: 2.5 GM/DL
GLUCOSE SERPL-MCNC: 127 MG/DL (ref 65–99)
HCT VFR BLD AUTO: 31.1 % (ref 34–46.6)
HGB BLD-MCNC: 10.4 G/DL (ref 12–15.9)
IMM GRANULOCYTES # BLD AUTO: 0.15 10*3/MM3 (ref 0–0.05)
IMM GRANULOCYTES NFR BLD AUTO: 1.4 % (ref 0–0.5)
LYMPHOCYTES # BLD AUTO: 0.86 10*3/MM3 (ref 0.7–3.1)
LYMPHOCYTES NFR BLD AUTO: 8.2 % (ref 19.6–45.3)
MAGNESIUM SERPL-MCNC: 5.5 MG/DL (ref 1.6–2.6)
MCH RBC QN AUTO: 32.9 PG (ref 26.6–33)
MCHC RBC AUTO-ENTMCNC: 33.4 G/DL (ref 31.5–35.7)
MCV RBC AUTO: 98.4 FL (ref 79–97)
MONOCYTES # BLD AUTO: 0.24 10*3/MM3 (ref 0.1–0.9)
MONOCYTES NFR BLD AUTO: 2.3 % (ref 5–12)
NEUTROPHILS NFR BLD AUTO: 88 % (ref 42.7–76)
NEUTROPHILS NFR BLD AUTO: 9.29 10*3/MM3 (ref 1.7–7)
NRBC BLD AUTO-RTO: 0 /100 WBC (ref 0–0.2)
PLATELET # BLD AUTO: 243 10*3/MM3 (ref 140–450)
PMV BLD AUTO: 9.4 FL (ref 6–12)
POTASSIUM SERPL-SCNC: 4 MMOL/L (ref 3.5–5.2)
PROT SERPL-MCNC: 5.8 G/DL (ref 6–8.5)
RBC # BLD AUTO: 3.16 10*6/MM3 (ref 3.77–5.28)
SODIUM SERPL-SCNC: 131 MMOL/L (ref 136–145)
WBC NRBC COR # BLD AUTO: 10.55 10*3/MM3 (ref 3.4–10.8)

## 2025-08-14 PROCEDURE — 25010000002 PENICILLIN G POTASSIUM PER 600000 UNITS: Performed by: OBSTETRICS & GYNECOLOGY

## 2025-08-14 PROCEDURE — 80053 COMPREHEN METABOLIC PANEL: CPT | Performed by: OBSTETRICS & GYNECOLOGY

## 2025-08-14 PROCEDURE — 99232 SBSQ HOSP IP/OBS MODERATE 35: CPT | Performed by: OBSTETRICS & GYNECOLOGY

## 2025-08-14 PROCEDURE — 83735 ASSAY OF MAGNESIUM: CPT | Performed by: OBSTETRICS & GYNECOLOGY

## 2025-08-14 PROCEDURE — 85025 COMPLETE CBC W/AUTO DIFF WBC: CPT | Performed by: OBSTETRICS & GYNECOLOGY

## 2025-08-14 PROCEDURE — 25010000002 MAGNESIUM SULFATE 20 GM/500ML SOLUTION: Performed by: OBSTETRICS & GYNECOLOGY

## 2025-08-14 PROCEDURE — 25810000003 LACTATED RINGERS PER 1000 ML: Performed by: OBSTETRICS & GYNECOLOGY

## 2025-08-14 PROCEDURE — 25010000002 BETAMETHASONE ACET & SOD PHOS PER 4 MG: Performed by: OBSTETRICS & GYNECOLOGY

## 2025-08-14 RX ORDER — ASPIRIN 81 MG/1
81 TABLET, CHEWABLE ORAL DAILY
Status: DISCONTINUED | OUTPATIENT
Start: 2025-08-14 | End: 2025-08-15 | Stop reason: HOSPADM

## 2025-08-14 RX ORDER — FERROUS SULFATE 325(65) MG
325 TABLET ORAL
Status: DISCONTINUED | OUTPATIENT
Start: 2025-08-14 | End: 2025-08-15 | Stop reason: HOSPADM

## 2025-08-14 RX ORDER — PRENATAL VIT/IRON FUM/FOLIC AC 27MG-0.8MG
1 TABLET ORAL DAILY
Status: DISCONTINUED | OUTPATIENT
Start: 2025-08-14 | End: 2025-08-15 | Stop reason: HOSPADM

## 2025-08-14 RX ADMIN — PENICILLIN G 3 MILLION UNITS: 3000000 INJECTION, SOLUTION INTRAVENOUS at 00:30

## 2025-08-14 RX ADMIN — PENICILLIN G 3 MILLION UNITS: 3000000 INJECTION, SOLUTION INTRAVENOUS at 08:07

## 2025-08-14 RX ADMIN — INDOMETHACIN 25 MG: 25 CAPSULE ORAL at 10:51

## 2025-08-14 RX ADMIN — BETAMETHASONE SODIUM PHOSPHATE AND BETAMETHASONE ACETATE 12 MG: 3; 3 INJECTION, SUSPENSION INTRA-ARTICULAR; INTRALESIONAL; INTRAMUSCULAR at 20:00

## 2025-08-14 RX ADMIN — PENICILLIN G 3 MILLION UNITS: 3000000 INJECTION, SOLUTION INTRAVENOUS at 11:58

## 2025-08-14 RX ADMIN — ACETAMINOPHEN 650 MG: 325 TABLET ORAL at 03:11

## 2025-08-14 RX ADMIN — ASPIRIN 81 MG CHEWABLE TABLET 81 MG: 81 TABLET CHEWABLE at 10:51

## 2025-08-14 RX ADMIN — DOCUSATE SODIUM 100 MG: 100 CAPSULE, LIQUID FILLED ORAL at 10:55

## 2025-08-14 RX ADMIN — PENICILLIN G 3 MILLION UNITS: 3000000 INJECTION, SOLUTION INTRAVENOUS at 04:52

## 2025-08-14 RX ADMIN — MAGNESIUM SULFATE HEPTAHYDRATE 2 G/HR: 40 INJECTION, SOLUTION INTRAVENOUS at 13:47

## 2025-08-14 RX ADMIN — INDOMETHACIN 25 MG: 25 CAPSULE ORAL at 04:54

## 2025-08-14 RX ADMIN — SODIUM CHLORIDE, POTASSIUM CHLORIDE, SODIUM LACTATE AND CALCIUM CHLORIDE 75 ML/HR: 600; 310; 30; 20 INJECTION, SOLUTION INTRAVENOUS at 22:02

## 2025-08-14 RX ADMIN — MAGNESIUM SULFATE HEPTAHYDRATE 2 G/HR: 40 INJECTION, SOLUTION INTRAVENOUS at 23:52

## 2025-08-14 RX ADMIN — FERROUS SULFATE TAB 325 MG (65 MG ELEMENTAL FE) 325 MG: 325 (65 FE) TAB at 10:51

## 2025-08-14 RX ADMIN — ACETAMINOPHEN 650 MG: 325 TABLET ORAL at 13:35

## 2025-08-14 RX ADMIN — ANTACID TABLETS 2 TABLET: 500 TABLET, CHEWABLE ORAL at 08:00

## 2025-08-14 RX ADMIN — ACETAMINOPHEN 650 MG: 325 TABLET ORAL at 19:59

## 2025-08-14 RX ADMIN — PENICILLIN G 3 MILLION UNITS: 3000000 INJECTION, SOLUTION INTRAVENOUS at 16:03

## 2025-08-14 RX ADMIN — PRENATAL VITAMINS-IRON FUMARATE 27 MG IRON-FOLIC ACID 0.8 MG TABLET 1 TABLET: at 10:51

## 2025-08-14 RX ADMIN — MAGNESIUM SULFATE HEPTAHYDRATE 2 G/HR: 40 INJECTION, SOLUTION INTRAVENOUS at 03:15

## 2025-08-14 RX ADMIN — INDOMETHACIN 25 MG: 25 CAPSULE ORAL at 22:57

## 2025-08-14 RX ADMIN — SODIUM CHLORIDE, POTASSIUM CHLORIDE, SODIUM LACTATE AND CALCIUM CHLORIDE 75 ML/HR: 600; 310; 30; 20 INJECTION, SOLUTION INTRAVENOUS at 08:06

## 2025-08-14 RX ADMIN — INDOMETHACIN 25 MG: 25 CAPSULE ORAL at 16:03

## 2025-08-15 VITALS
WEIGHT: 140 LBS | RESPIRATION RATE: 15 BRPM | OXYGEN SATURATION: 96 % | DIASTOLIC BLOOD PRESSURE: 76 MMHG | TEMPERATURE: 97.9 F | HEIGHT: 69 IN | HEART RATE: 77 BPM | BODY MASS INDEX: 20.73 KG/M2 | SYSTOLIC BLOOD PRESSURE: 123 MMHG

## 2025-08-15 LAB — BACTERIA SPEC AEROBE CULT: NO GROWTH

## 2025-08-15 PROCEDURE — 99238 HOSP IP/OBS DSCHRG MGMT 30/<: CPT | Performed by: STUDENT IN AN ORGANIZED HEALTH CARE EDUCATION/TRAINING PROGRAM

## 2025-08-15 RX ADMIN — ANTACID TABLETS 2 TABLET: 500 TABLET, CHEWABLE ORAL at 01:08

## 2025-08-15 RX ADMIN — PRENATAL VITAMINS-IRON FUMARATE 27 MG IRON-FOLIC ACID 0.8 MG TABLET 1 TABLET: at 08:35

## 2025-08-15 RX ADMIN — ASPIRIN 81 MG CHEWABLE TABLET 81 MG: 81 TABLET CHEWABLE at 08:35

## 2025-08-15 RX ADMIN — FERROUS SULFATE TAB 325 MG (65 MG ELEMENTAL FE) 325 MG: 325 (65 FE) TAB at 08:35

## 2025-08-15 RX ADMIN — INDOMETHACIN 25 MG: 25 CAPSULE ORAL at 10:07

## 2025-08-15 RX ADMIN — ACETAMINOPHEN 650 MG: 325 TABLET ORAL at 06:37

## 2025-08-15 RX ADMIN — INDOMETHACIN 25 MG: 25 CAPSULE ORAL at 05:47

## 2025-08-18 ENCOUNTER — TELEPHONE (OUTPATIENT)
Facility: HOSPITAL | Age: 35
End: 2025-08-18
Payer: COMMERCIAL

## 2025-08-19 ENCOUNTER — ROUTINE PRENATAL (OUTPATIENT)
Dept: OBSTETRICS AND GYNECOLOGY | Age: 35
End: 2025-08-19
Payer: COMMERCIAL

## 2025-08-19 VITALS — SYSTOLIC BLOOD PRESSURE: 132 MMHG | DIASTOLIC BLOOD PRESSURE: 68 MMHG | WEIGHT: 139.6 LBS | BODY MASS INDEX: 20.62 KG/M2

## 2025-08-19 DIAGNOSIS — O12.13 PROTEINURIA AFFECTING PREGNANCY IN THIRD TRIMESTER: ICD-10-CM

## 2025-08-19 DIAGNOSIS — Z13.89 SCREENING FOR HEMATURIA OR PROTEINURIA: ICD-10-CM

## 2025-08-19 DIAGNOSIS — Z3A.30 30 WEEKS GESTATION OF PREGNANCY: Primary | ICD-10-CM

## 2025-08-19 DIAGNOSIS — O99.019 MATERNAL ANEMIA IN PREGNANCY, ANTEPARTUM: ICD-10-CM

## 2025-08-19 DIAGNOSIS — O47.00 PRETERM UTERINE CONTRACTIONS, ANTEPARTUM: ICD-10-CM

## 2025-08-19 DIAGNOSIS — O14.00 ANTEPARTUM MILD PREECLAMPSIA: ICD-10-CM

## 2025-08-19 PROBLEM — O26.899 HEADACHE IN PREGNANCY, ANTEPARTUM: Status: RESOLVED | Noted: 2025-08-13 | Resolved: 2025-08-19

## 2025-08-19 PROBLEM — R51.9 HEADACHE IN PREGNANCY, ANTEPARTUM: Status: RESOLVED | Noted: 2025-08-13 | Resolved: 2025-08-19

## 2025-08-19 RX ORDER — FERROUS SULFATE 325(65) MG
325 TABLET ORAL 2 TIMES DAILY
Qty: 60 TABLET | Refills: 5 | Status: SHIPPED | OUTPATIENT
Start: 2025-08-19

## 2025-08-19 RX ORDER — CLOTRIMAZOLE 1 %
CREAM WITH APPLICATOR VAGINAL NIGHTLY
Qty: 1 EACH | Refills: 0 | Status: SHIPPED | OUTPATIENT
Start: 2025-08-19 | End: 2025-08-25

## 2025-08-19 RX ORDER — DOCUSATE SODIUM 100 MG/1
100 CAPSULE, LIQUID FILLED ORAL 2 TIMES DAILY
Qty: 60 CAPSULE | Refills: 1 | Status: SHIPPED | OUTPATIENT
Start: 2025-08-19

## 2025-08-20 ENCOUNTER — HOSPITAL ENCOUNTER (EMERGENCY)
Facility: HOSPITAL | Age: 35
Discharge: HOME | End: 2025-08-20
Attending: OBSTETRICS & GYNECOLOGY | Admitting: OBSTETRICS & GYNECOLOGY
Payer: COMMERCIAL

## 2025-08-20 ENCOUNTER — RESULTS FOLLOW-UP (OUTPATIENT)
Dept: OBSTETRICS AND GYNECOLOGY | Age: 35
End: 2025-08-20
Payer: COMMERCIAL

## 2025-08-20 DIAGNOSIS — O14.00 ANTEPARTUM MILD PREECLAMPSIA: Primary | ICD-10-CM

## 2025-08-20 PROBLEM — R74.8 ELEVATED LIVER ENZYMES: Status: ACTIVE | Noted: 2025-08-20

## 2025-08-20 PROBLEM — O12.13 PROTEINURIA AFFECTING PREGNANCY IN THIRD TRIMESTER: Status: RESOLVED | Noted: 2025-08-19 | Resolved: 2025-08-20

## 2025-08-20 LAB
ALBUMIN SERPL-MCNC: 3.6 G/DL (ref 3.5–5.2)
ALBUMIN/GLOB SERPL: 1.5 G/DL
ALP SERPL-CCNC: 78 U/L (ref 39–117)
ALT SERPL-CCNC: 77 U/L (ref 1–33)
AST SERPL-CCNC: 53 U/L (ref 1–32)
BASOPHILS # BLD AUTO: 0.06 10*3/MM3 (ref 0–0.2)
BASOPHILS NFR BLD AUTO: 0.4 % (ref 0–1.5)
BILIRUB SERPL-MCNC: 0.2 MG/DL (ref 0–1.2)
BUN SERPL-MCNC: 7 MG/DL (ref 6–20)
BUN/CREAT SERPL: 18.9 (ref 7–25)
CALCIUM SERPL-MCNC: 8.9 MG/DL (ref 8.6–10.5)
CHLORIDE SERPL-SCNC: 103 MMOL/L (ref 98–107)
CO2 SERPL-SCNC: 22.9 MMOL/L (ref 22–29)
CREAT SERPL-MCNC: 0.37 MG/DL (ref 0.57–1)
EGFRCR SERPLBLD CKD-EPI 2021: 135.9 ML/MIN/1.73
EOSINOPHIL # BLD AUTO: 0.17 10*3/MM3 (ref 0–0.4)
EOSINOPHIL NFR BLD AUTO: 1.2 % (ref 0.3–6.2)
ERYTHROCYTE [DISTWIDTH] IN BLOOD BY AUTOMATED COUNT: 11.7 % (ref 12.3–15.4)
GLOBULIN SER CALC-MCNC: 2.4 GM/DL
GLUCOSE SERPL-MCNC: 82 MG/DL (ref 65–99)
HCT VFR BLD AUTO: 30.4 % (ref 34–46.6)
HGB BLD-MCNC: 10.2 G/DL (ref 12–15.9)
IMM GRANULOCYTES # BLD AUTO: 0.48 10*3/MM3 (ref 0–0.05)
IMM GRANULOCYTES NFR BLD AUTO: 3.4 % (ref 0–0.5)
LYMPHOCYTES # BLD AUTO: 2.21 10*3/MM3 (ref 0.7–3.1)
LYMPHOCYTES NFR BLD AUTO: 15.8 % (ref 19.6–45.3)
MCH RBC QN AUTO: 33 PG (ref 26.6–33)
MCHC RBC AUTO-ENTMCNC: 33.6 G/DL (ref 31.5–35.7)
MCV RBC AUTO: 98.4 FL (ref 79–97)
MONOCYTES # BLD AUTO: 0.97 10*3/MM3 (ref 0.1–0.9)
MONOCYTES NFR BLD AUTO: 7 % (ref 5–12)
NEUTROPHILS # BLD AUTO: 10.06 10*3/MM3 (ref 1.7–7)
NEUTROPHILS NFR BLD AUTO: 72.2 % (ref 42.7–76)
NRBC BLD AUTO-RTO: 0 /100 WBC (ref 0–0.2)
PLATELET # BLD AUTO: 295 10*3/MM3 (ref 140–450)
POTASSIUM SERPL-SCNC: 4 MMOL/L (ref 3.5–5.2)
PROT SERPL-MCNC: 6 G/DL (ref 6–8.5)
RBC # BLD AUTO: 3.09 10*6/MM3 (ref 3.77–5.28)
SODIUM SERPL-SCNC: 136 MMOL/L (ref 136–145)
WBC # BLD AUTO: 13.95 10*3/MM3 (ref 3.4–10.8)

## 2025-08-22 ENCOUNTER — LAB (OUTPATIENT)
Facility: HOSPITAL | Age: 35
End: 2025-08-22
Payer: COMMERCIAL

## 2025-08-22 ENCOUNTER — ROUTINE PRENATAL (OUTPATIENT)
Dept: OBSTETRICS AND GYNECOLOGY | Age: 35
End: 2025-08-22
Payer: COMMERCIAL

## 2025-08-22 VITALS — SYSTOLIC BLOOD PRESSURE: 128 MMHG | WEIGHT: 137.8 LBS | BODY MASS INDEX: 20.35 KG/M2 | DIASTOLIC BLOOD PRESSURE: 66 MMHG

## 2025-08-22 DIAGNOSIS — O47.00 PRETERM UTERINE CONTRACTIONS, ANTEPARTUM: ICD-10-CM

## 2025-08-22 DIAGNOSIS — O14.00 ANTEPARTUM MILD PREECLAMPSIA: ICD-10-CM

## 2025-08-22 DIAGNOSIS — R74.8 ELEVATED LIVER ENZYMES: ICD-10-CM

## 2025-08-22 DIAGNOSIS — O99.019 MATERNAL ANEMIA IN PREGNANCY, ANTEPARTUM: ICD-10-CM

## 2025-08-22 DIAGNOSIS — Z3A.31 31 WEEKS GESTATION OF PREGNANCY: Primary | ICD-10-CM

## 2025-08-22 DIAGNOSIS — Z13.89 SCREENING FOR HEMATURIA OR PROTEINURIA: ICD-10-CM

## 2025-08-22 LAB
ALBUMIN SERPL-MCNC: 3.5 G/DL (ref 3.5–5.2)
ALBUMIN/GLOB SERPL: 1.2 G/DL
ALP SERPL-CCNC: 78 U/L (ref 39–117)
ALT SERPL W P-5'-P-CCNC: 42 U/L (ref 1–33)
ANION GAP SERPL CALCULATED.3IONS-SCNC: 9.7 MMOL/L (ref 5–15)
AST SERPL-CCNC: 25 U/L (ref 1–32)
BASOPHILS # BLD AUTO: 0.04 10*3/MM3 (ref 0–0.2)
BASOPHILS NFR BLD AUTO: 0.4 % (ref 0–1.5)
BILE AC SERPL-SCNC: 4 UMOL/L (ref 0–10)
BILIRUB SERPL-MCNC: 0.3 MG/DL (ref 0–1.2)
BUN SERPL-MCNC: 5 MG/DL (ref 6–20)
BUN/CREAT SERPL: 9.3 (ref 7–25)
CALCIUM SPEC-SCNC: 9.1 MG/DL (ref 8.6–10.5)
CHLORIDE SERPL-SCNC: 103 MMOL/L (ref 98–107)
CLARITY, POC: CLEAR
CO2 SERPL-SCNC: 23.3 MMOL/L (ref 22–29)
COLOR UR: YELLOW
CREAT SERPL-MCNC: 0.54 MG/DL (ref 0.57–1)
DEPRECATED RDW RBC AUTO: 41.3 FL (ref 37–54)
EGFRCR SERPLBLD CKD-EPI 2021: 124.1 ML/MIN/1.73
EOSINOPHIL # BLD AUTO: 0.11 10*3/MM3 (ref 0–0.4)
EOSINOPHIL NFR BLD AUTO: 1.1 % (ref 0.3–6.2)
ERYTHROCYTE [DISTWIDTH] IN BLOOD BY AUTOMATED COUNT: 11.7 % (ref 12.3–15.4)
GLOBULIN UR ELPH-MCNC: 3 GM/DL
GLUCOSE SERPL-MCNC: 105 MG/DL (ref 65–99)
GLUCOSE UR STRIP-MCNC: NEGATIVE MG/DL
HCT VFR BLD AUTO: 32 % (ref 34–46.6)
HGB BLD-MCNC: 10.6 G/DL (ref 12–15.9)
IMM GRANULOCYTES # BLD AUTO: 0.49 10*3/MM3 (ref 0–0.05)
IMM GRANULOCYTES NFR BLD AUTO: 4.7 % (ref 0–0.5)
LYMPHOCYTES # BLD AUTO: 2.13 10*3/MM3 (ref 0.7–3.1)
LYMPHOCYTES NFR BLD AUTO: 20.5 % (ref 19.6–45.3)
MCH RBC QN AUTO: 32.3 PG (ref 26.6–33)
MCHC RBC AUTO-ENTMCNC: 33.1 G/DL (ref 31.5–35.7)
MCV RBC AUTO: 97.6 FL (ref 79–97)
MONOCYTES # BLD AUTO: 0.7 10*3/MM3 (ref 0.1–0.9)
MONOCYTES NFR BLD AUTO: 6.7 % (ref 5–12)
NEUTROPHILS NFR BLD AUTO: 6.91 10*3/MM3 (ref 1.7–7)
NEUTROPHILS NFR BLD AUTO: 66.6 % (ref 42.7–76)
NRBC BLD AUTO-RTO: 0 /100 WBC (ref 0–0.2)
PLATELET # BLD AUTO: 294 10*3/MM3 (ref 140–450)
PMV BLD AUTO: 9.5 FL (ref 6–12)
POTASSIUM SERPL-SCNC: 4 MMOL/L (ref 3.5–5.2)
PROT SERPL-MCNC: 6.5 G/DL (ref 6–8.5)
PROT UR STRIP-MCNC: NEGATIVE MG/DL
RBC # BLD AUTO: 3.28 10*6/MM3 (ref 3.77–5.28)
SODIUM SERPL-SCNC: 136 MMOL/L (ref 136–145)
WBC NRBC COR # BLD AUTO: 10.38 10*3/MM3 (ref 3.4–10.8)

## 2025-08-22 PROCEDURE — 36415 COLL VENOUS BLD VENIPUNCTURE: CPT | Performed by: OBSTETRICS & GYNECOLOGY

## 2025-08-23 LAB
PROT 24H UR-MRATE: <156 MG/24 HR (ref 30–150)
PROT UR-MCNC: <4 MG/DL

## 2025-08-24 PROBLEM — O13.9 GESTATIONAL HYPERTENSION, ANTEPARTUM: Status: ACTIVE | Noted: 2025-08-19

## 2025-08-26 ENCOUNTER — LAB (OUTPATIENT)
Facility: HOSPITAL | Age: 35
End: 2025-08-26
Payer: COMMERCIAL

## 2025-08-26 ENCOUNTER — ROUTINE PRENATAL (OUTPATIENT)
Dept: OBSTETRICS AND GYNECOLOGY | Age: 35
End: 2025-08-26
Payer: COMMERCIAL

## 2025-08-26 VITALS — BODY MASS INDEX: 20.59 KG/M2 | SYSTOLIC BLOOD PRESSURE: 114 MMHG | DIASTOLIC BLOOD PRESSURE: 62 MMHG | WEIGHT: 139.4 LBS

## 2025-08-26 DIAGNOSIS — Z3A.31 31 WEEKS GESTATION OF PREGNANCY: Primary | ICD-10-CM

## 2025-08-26 DIAGNOSIS — R74.8 ELEVATED LIVER ENZYMES: ICD-10-CM

## 2025-08-26 DIAGNOSIS — O13.9 GESTATIONAL HYPERTENSION, ANTEPARTUM: ICD-10-CM

## 2025-08-26 DIAGNOSIS — N96 RECURRENT PREGNANCY LOSS: ICD-10-CM

## 2025-08-26 DIAGNOSIS — O47.00 PRETERM UTERINE CONTRACTIONS, ANTEPARTUM: ICD-10-CM

## 2025-08-26 DIAGNOSIS — Z13.89 SCREENING FOR HEMATURIA OR PROTEINURIA: ICD-10-CM

## 2025-08-26 LAB
ALBUMIN SERPL-MCNC: 3.6 G/DL (ref 3.5–5.2)
ALBUMIN/GLOB SERPL: 1.4 G/DL
ALP SERPL-CCNC: 84 U/L (ref 39–117)
ALT SERPL W P-5'-P-CCNC: 19 U/L (ref 1–33)
ANION GAP SERPL CALCULATED.3IONS-SCNC: 12.1 MMOL/L (ref 5–15)
AST SERPL-CCNC: 16 U/L (ref 1–32)
BASOPHILS # BLD MANUAL: 0.13 10*3/MM3 (ref 0–0.2)
BASOPHILS NFR BLD MANUAL: 1 % (ref 0–1.5)
BILIRUB SERPL-MCNC: 0.2 MG/DL (ref 0–1.2)
BUN SERPL-MCNC: 6 MG/DL (ref 6–20)
BUN/CREAT SERPL: 12.5 (ref 7–25)
CALCIUM SPEC-SCNC: 9 MG/DL (ref 8.6–10.5)
CHLORIDE SERPL-SCNC: 101 MMOL/L (ref 98–107)
CLARITY, POC: CLEAR
CO2 SERPL-SCNC: 22.9 MMOL/L (ref 22–29)
COLOR UR: YELLOW
CREAT SERPL-MCNC: 0.48 MG/DL (ref 0.57–1)
DEPRECATED RDW RBC AUTO: 45 FL (ref 37–54)
EGFRCR SERPLBLD CKD-EPI 2021: 127.6 ML/MIN/1.73
EOSINOPHIL # BLD MANUAL: 0 10*3/MM3 (ref 0–0.4)
EOSINOPHIL NFR BLD MANUAL: 0 % (ref 0.3–6.2)
ERYTHROCYTE [DISTWIDTH] IN BLOOD BY AUTOMATED COUNT: 12.4 % (ref 12.3–15.4)
GLOBULIN UR ELPH-MCNC: 2.6 GM/DL
GLUCOSE SERPL-MCNC: 65 MG/DL (ref 65–99)
GLUCOSE UR STRIP-MCNC: NEGATIVE MG/DL
HCT VFR BLD AUTO: 32.3 % (ref 34–46.6)
HGB BLD-MCNC: 10.6 G/DL (ref 12–15.9)
LYMPHOCYTES # BLD MANUAL: 1.68 10*3/MM3 (ref 0.7–3.1)
LYMPHOCYTES NFR BLD MANUAL: 8 % (ref 5–12)
MCH RBC QN AUTO: 32.3 PG (ref 26.6–33)
MCHC RBC AUTO-ENTMCNC: 32.8 G/DL (ref 31.5–35.7)
MCV RBC AUTO: 98.5 FL (ref 79–97)
MONOCYTES # BLD: 1.03 10*3/MM3 (ref 0.1–0.9)
NEUTROPHILS # BLD AUTO: 10.08 10*3/MM3 (ref 1.7–7)
NEUTROPHILS NFR BLD MANUAL: 78 % (ref 42.7–76)
NRBC BLD AUTO-RTO: 0 /100 WBC (ref 0–0.2)
PLAT MORPH BLD: NORMAL
PLATELET # BLD AUTO: 269 10*3/MM3 (ref 140–450)
PMV BLD AUTO: 9.5 FL (ref 6–12)
POTASSIUM SERPL-SCNC: 4.1 MMOL/L (ref 3.5–5.2)
PROT SERPL-MCNC: 6.2 G/DL (ref 6–8.5)
PROT UR STRIP-MCNC: NEGATIVE MG/DL
RBC # BLD AUTO: 3.28 10*6/MM3 (ref 3.77–5.28)
RBC MORPH BLD: NORMAL
SODIUM SERPL-SCNC: 136 MMOL/L (ref 136–145)
VARIANT LYMPHS NFR BLD MANUAL: 13 % (ref 19.6–45.3)
WBC MORPH BLD: NORMAL
WBC NRBC COR # BLD AUTO: 12.92 10*3/MM3 (ref 3.4–10.8)

## 2025-08-26 PROCEDURE — 80053 COMPREHEN METABOLIC PANEL: CPT | Performed by: OBSTETRICS & GYNECOLOGY

## 2025-08-27 ENCOUNTER — LAB (OUTPATIENT)
Facility: HOSPITAL | Age: 35
End: 2025-08-27
Payer: COMMERCIAL

## 2025-08-27 DIAGNOSIS — O13.9 GESTATIONAL HYPERTENSION, ANTEPARTUM: Primary | ICD-10-CM

## 2025-08-27 LAB
COLLECT DURATION TIME UR: 24 HRS
PROT 24H UR-MRATE: 148 MG/24HOURS (ref 0–150)
SPECIMEN VOL 24H UR: 2000 ML

## 2025-08-27 PROCEDURE — 81050 URINALYSIS VOLUME MEASURE: CPT | Performed by: OBSTETRICS & GYNECOLOGY

## 2025-08-27 PROCEDURE — 84156 ASSAY OF PROTEIN URINE: CPT | Performed by: OBSTETRICS & GYNECOLOGY

## 2025-08-29 ENCOUNTER — ROUTINE PRENATAL (OUTPATIENT)
Dept: OBSTETRICS AND GYNECOLOGY | Age: 35
End: 2025-08-29
Payer: COMMERCIAL

## 2025-08-29 VITALS — BODY MASS INDEX: 20.53 KG/M2 | SYSTOLIC BLOOD PRESSURE: 124 MMHG | WEIGHT: 139 LBS | DIASTOLIC BLOOD PRESSURE: 72 MMHG

## 2025-08-29 DIAGNOSIS — O20.8 SUBCHORIONIC HEMORRHAGE IN FIRST TRIMESTER: ICD-10-CM

## 2025-08-29 DIAGNOSIS — O13.9 GESTATIONAL HYPERTENSION, ANTEPARTUM: ICD-10-CM

## 2025-08-29 DIAGNOSIS — O47.00 PRETERM UTERINE CONTRACTIONS, ANTEPARTUM: ICD-10-CM

## 2025-08-29 DIAGNOSIS — O09.299 HISTORY OF PRE-ECLAMPSIA IN PRIOR PREGNANCY, CURRENTLY PREGNANT: ICD-10-CM

## 2025-08-29 DIAGNOSIS — R74.8 ELEVATED LIVER ENZYMES: ICD-10-CM

## 2025-08-29 DIAGNOSIS — Z3A.32 32 WEEKS GESTATION OF PREGNANCY: Primary | ICD-10-CM

## 2025-08-29 DIAGNOSIS — O99.019 MATERNAL ANEMIA IN PREGNANCY, ANTEPARTUM: ICD-10-CM

## 2025-08-29 LAB
GLUCOSE UR STRIP-MCNC: NEGATIVE MG/DL
PROT UR STRIP-MCNC: NEGATIVE MG/DL